# Patient Record
Sex: MALE | Race: WHITE | NOT HISPANIC OR LATINO | Employment: OTHER | URBAN - METROPOLITAN AREA
[De-identification: names, ages, dates, MRNs, and addresses within clinical notes are randomized per-mention and may not be internally consistent; named-entity substitution may affect disease eponyms.]

---

## 2021-10-21 ENCOUNTER — OFFICE VISIT (OUTPATIENT)
Dept: CARDIOLOGY | Facility: CLINIC | Age: 86
End: 2021-10-21

## 2021-10-21 VITALS
DIASTOLIC BLOOD PRESSURE: 86 MMHG | HEART RATE: 62 BPM | WEIGHT: 163 LBS | HEIGHT: 68 IN | SYSTOLIC BLOOD PRESSURE: 173 MMHG | BODY MASS INDEX: 24.71 KG/M2

## 2021-10-21 DIAGNOSIS — I10 PRIMARY HYPERTENSION: ICD-10-CM

## 2021-10-21 DIAGNOSIS — R94.31 ABNORMAL EKG: Primary | ICD-10-CM

## 2021-10-21 PROCEDURE — 93000 ELECTROCARDIOGRAM COMPLETE: CPT | Performed by: INTERNAL MEDICINE

## 2021-10-21 PROCEDURE — 99203 OFFICE O/P NEW LOW 30 MIN: CPT | Performed by: INTERNAL MEDICINE

## 2021-10-21 RX ORDER — MIRTAZAPINE 7.5 MG/1
TABLET, FILM COATED ORAL
COMMUNITY
Start: 2021-09-12

## 2021-10-21 RX ORDER — DIPHENOXYLATE HYDROCHLORIDE AND ATROPINE SULFATE 2.5; .025 MG/1; MG/1
TABLET ORAL
COMMUNITY

## 2021-10-21 RX ORDER — LOSARTAN POTASSIUM 100 MG/1
TABLET ORAL
COMMUNITY
Start: 2021-09-28

## 2021-10-21 RX ORDER — MAGNESIUM CARB/ALUMINUM HYDROX 105-160MG
TABLET,CHEWABLE ORAL ONCE
COMMUNITY

## 2021-10-21 RX ORDER — BUMETANIDE 1 MG/1
TABLET ORAL
COMMUNITY
Start: 2021-09-26

## 2021-10-21 RX ORDER — DOCUSATE SODIUM 100 MG/1
100 CAPSULE, LIQUID FILLED ORAL 2 TIMES DAILY
COMMUNITY

## 2021-10-21 RX ORDER — TIMOLOL MALEATE 5 MG/ML
SOLUTION/ DROPS OPHTHALMIC
COMMUNITY
Start: 2021-09-26

## 2021-10-21 RX ORDER — LANOLIN ALCOHOL/MO/W.PET/CERES
1000 CREAM (GRAM) TOPICAL DAILY
COMMUNITY

## 2021-10-21 NOTE — PROGRESS NOTES
ABNORMAL EKG, REFERRED BY DR. BIANCA LANE   Subjective:        [facility]  Cardiology Consult Note    Patient Identification:  Name: Nino Kat  Age: 89 y.o.  Sex: male  :  1930  MRN: [id number]             CC  ABNORMAL EKG      History of Present Illness:   89-year-old male here for the cardiac evaluation as the patient has been found to have the abnormal EKG, patient also known to have the benign essential arterial hypertension    No significant chest pains    Complains of breath on moderate exertion only    Comorbid cardiac risk factors:     Past Medical History:  History reviewed. No pertinent past medical history.  Past Surgical History:  Past Surgical History:   Procedure Laterality Date   • ROTATOR CUFF REPAIR Left    • TOTAL HIP ARTHROPLASTY Left       Allergies:  Allergies   Allergen Reactions   • Diclofenac Hives and Rash     Gel     Home Meds:  (Not in a hospital admission)    Current Meds:   [unfilled]  Social History:   Social History     Tobacco Use   • Smoking status: Former Smoker     Quit date:      Years since quittin.8   • Smokeless tobacco: Never Used   Substance Use Topics   • Alcohol use: Yes     Comment: OCCASIONAL      Family History:  Family History   Family history unknown: Yes        Review of Systems    Constitutional: No weakness,fatigue, fever, rigors, chills   Eyes: No vision changes, eye pain   ENT/oropharynx: No difficulty swallowing, sore throat, epistaxis, changes in hearing   Cardiovascular: No chest pain, chest tightness, palpitations, paroxysmal nocturnal dyspnea, orthopnea, diaphoresis, dizziness / syncopal episode   Respiratory: No shortness of breath, dyspnea on exertion, cough, wheezing hemoptysis   Gastrointestinal: No abdominal pain, nausea, vomiting, diarrhea, bloody stools   Genitourinary: No hematuria, dysuria   Neurological: No headache, tremors, numbness,  one-sided weakness    Musculoskeletal: No cramps, myalgias,  joint pain, joint  swelling   Integument: No rash, edema           Constitutional:  Heart Rate:  [62] 62  BP: (173)/(86) 173/86    Physical Exam   General:  Appears in no acute distress  Eyes: PERTL,  HEENT:  No JVD. Thyroid not visibly enlarged. No mucosal pallor or cyanosis  Respiratory: Respirations regular and unlabored at rest. BBS with good air entry in all fields. No crackles, rubs or wheezes auscultated  Cardiovascular: S1S2 Regular rate and rhythm. No murmur, rub or gallop auscultated. No carotid bruits. DP/PT pulses    . No pretibial pitting edema  Gastrointestinal: Abdomen soft, flat, non tender. Bowel sounds present. No hepatosplenomegaly. No ascites  Musculoskeletal: DAWN x4. No abnormal movements  Extremities: No digital clubbing or cyanosis  Skin: Color pink. Skin warm and dry to touch. No rashes  No xanthoma  Neuro: AAO x3 CN II-XII grossly intact            ECG 12 Lead    Date/Time: 10/21/2021 9:55 AM  Performed by: Thomas Ramírez MD  Authorized by: Thomas Ramírez MD   Comparison: not compared with previous ECG   Previous ECG: no previous ECG available  Rhythm: sinus rhythm  ST Flattening: all    Clinical impression: non-specific ECG                Cardiographics  ECG:     Telemetry:    Echocardiogram:     Imaging  Chest X-ray:     Lab Review               @[email or web address]@              Assessment:/ Recommendations / Plan:   Patient Active Problem List   Diagnosis   • Abnormal EKG   • Primary hypertension                    ICD-10-CM ICD-9-CM   1. Abnormal EKG  R94.31 794.31   2. Primary hypertension  I10 401.9     1. Abnormal EKG  Considering the patient's symptoms as well as clinical situation and  EKG findings, along with cardiac risk factors, ischemic workup is necessary to rule out ischemic cardiomyopathy, stress induced arrhythmias, and functional capacity for diagnosis as well as prognostic consideration    - Stress Test With Myocardial Perfusion One Day  - Adult Transthoracic Echo Complete W/ Cont  if Necessary Per Protocol    2. Primary hypertension  Considering patient's medical condition as well as the risk factors, patient will require echocardiogram for further evaluation for the LV function, four-chamber evaluation, including the pressures, valvular function and  pericardial disease and pericardial effusion       ECHO. WALKING HARRIETT    Labs/tests ordered for meron Ramírez MD  10/21/2021, 09:55 EDT      EMR Dragon/Transcription:   Dictated utilizing Dragon dictation

## 2021-10-26 ENCOUNTER — HOSPITAL ENCOUNTER (OUTPATIENT)
Dept: CARDIOLOGY | Facility: HOSPITAL | Age: 86
Discharge: HOME | End: 2021-10-26
Admitting: INTERNAL MEDICINE

## 2021-10-26 VITALS
SYSTOLIC BLOOD PRESSURE: 138 MMHG | HEART RATE: 58 BPM | HEIGHT: 68 IN | DIASTOLIC BLOOD PRESSURE: 76 MMHG | WEIGHT: 163 LBS | BODY MASS INDEX: 24.71 KG/M2

## 2021-10-26 PROCEDURE — 93306 TTE W/DOPPLER COMPLETE: CPT | Performed by: INTERNAL MEDICINE

## 2021-10-26 PROCEDURE — 93306 TTE W/DOPPLER COMPLETE: CPT

## 2021-10-27 PROBLEM — I10 PRIMARY HYPERTENSION: Status: ACTIVE | Noted: 2021-10-27

## 2021-10-27 PROBLEM — R94.31 ABNORMAL EKG: Status: ACTIVE | Noted: 2021-10-27

## 2021-10-27 LAB
AORTIC DIMENSIONLESS INDEX: 0.6 (DI)
ASCENDING AORTA: 3.2 CM
BH CV ECHO MEAS - ACS: 1.8 CM
BH CV ECHO MEAS - AO MAX PG (FULL): 5 MMHG
BH CV ECHO MEAS - AO MAX PG: 9 MMHG
BH CV ECHO MEAS - AO MEAN PG (FULL): 2.9 MMHG
BH CV ECHO MEAS - AO MEAN PG: 5 MMHG
BH CV ECHO MEAS - AO ROOT AREA (BSA CORRECTED): 1.9
BH CV ECHO MEAS - AO ROOT AREA: 10.2 CM^2
BH CV ECHO MEAS - AO ROOT DIAM: 3.6 CM
BH CV ECHO MEAS - AO V2 MAX: 150.3 CM/SEC
BH CV ECHO MEAS - AO V2 MEAN: 104.6 CM/SEC
BH CV ECHO MEAS - AO V2 VTI: 34.5 CM
BH CV ECHO MEAS - ASC AORTA: 3.2 CM
BH CV ECHO MEAS - AVA(I,A): 2.3 CM^2
BH CV ECHO MEAS - AVA(I,D): 2.3 CM^2
BH CV ECHO MEAS - AVA(V,A): 2.5 CM^2
BH CV ECHO MEAS - AVA(V,D): 2.5 CM^2
BH CV ECHO MEAS - BSA(HAYCOCK): 1.9 M^2
BH CV ECHO MEAS - BSA: 1.9 M^2
BH CV ECHO MEAS - BZI_BMI: 24.8 KILOGRAMS/M^2
BH CV ECHO MEAS - BZI_METRIC_HEIGHT: 172.7 CM
BH CV ECHO MEAS - BZI_METRIC_WEIGHT: 73.9 KG
BH CV ECHO MEAS - EDV(CUBED): 56 ML
BH CV ECHO MEAS - EDV(MOD-SP2): 92 ML
BH CV ECHO MEAS - EDV(MOD-SP4): 87 ML
BH CV ECHO MEAS - EDV(TEICH): 63 ML
BH CV ECHO MEAS - EF(CUBED): 68.2 %
BH CV ECHO MEAS - EF(MOD-BP): 65.3 %
BH CV ECHO MEAS - EF(MOD-SP2): 65.2 %
BH CV ECHO MEAS - EF(MOD-SP4): 65.5 %
BH CV ECHO MEAS - EF(TEICH): 60.5 %
BH CV ECHO MEAS - ESV(CUBED): 17.8 ML
BH CV ECHO MEAS - ESV(MOD-SP2): 32 ML
BH CV ECHO MEAS - ESV(MOD-SP4): 30 ML
BH CV ECHO MEAS - ESV(TEICH): 24.9 ML
BH CV ECHO MEAS - FS: 31.8 %
BH CV ECHO MEAS - IVS/LVPW: 0.9
BH CV ECHO MEAS - IVSD: 0.98 CM
BH CV ECHO MEAS - LAT PEAK E' VEL: 7.6 CM/SEC
BH CV ECHO MEAS - LV DIASTOLIC VOL/BSA (35-75): 46.4 ML/M^2
BH CV ECHO MEAS - LV MASS(C)D: 125.5 GRAMS
BH CV ECHO MEAS - LV MASS(C)DI: 67 GRAMS/M^2
BH CV ECHO MEAS - LV MAX PG: 4.1 MMHG
BH CV ECHO MEAS - LV MEAN PG: 2 MMHG
BH CV ECHO MEAS - LV SYSTOLIC VOL/BSA (12-30): 16 ML/M^2
BH CV ECHO MEAS - LV V1 MAX: 100.9 CM/SEC
BH CV ECHO MEAS - LV V1 MEAN: 66.2 CM/SEC
BH CV ECHO MEAS - LV V1 VTI: 21.5 CM
BH CV ECHO MEAS - LVIDD: 3.8 CM
BH CV ECHO MEAS - LVIDS: 2.6 CM
BH CV ECHO MEAS - LVLD AP2: 8.3 CM
BH CV ECHO MEAS - LVLD AP4: 8.3 CM
BH CV ECHO MEAS - LVLS AP2: 6.6 CM
BH CV ECHO MEAS - LVLS AP4: 6.8 CM
BH CV ECHO MEAS - LVOT AREA (M): 3.8 CM^2
BH CV ECHO MEAS - LVOT AREA: 3.7 CM^2
BH CV ECHO MEAS - LVOT DIAM: 2.2 CM
BH CV ECHO MEAS - LVPWD: 1.1 CM
BH CV ECHO MEAS - MED PEAK E' VEL: 4.3 CM/SEC
BH CV ECHO MEAS - MR MAX PG: 66.3 MMHG
BH CV ECHO MEAS - MR MAX VEL: 407.2 CM/SEC
BH CV ECHO MEAS - MV A DUR: 0.11 SEC
BH CV ECHO MEAS - MV A MAX VEL: 86.3 CM/SEC
BH CV ECHO MEAS - MV DEC SLOPE: 288.6 CM/SEC^2
BH CV ECHO MEAS - MV DEC TIME: 267 SEC
BH CV ECHO MEAS - MV E MAX VEL: 59.7 CM/SEC
BH CV ECHO MEAS - MV E/A: 0.69
BH CV ECHO MEAS - MV MAX PG: 4.2 MMHG
BH CV ECHO MEAS - MV MEAN PG: 1.3 MMHG
BH CV ECHO MEAS - MV P1/2T MAX VEL: 100.2 CM/SEC
BH CV ECHO MEAS - MV P1/2T: 101.7 MSEC
BH CV ECHO MEAS - MV V2 MAX: 101.9 CM/SEC
BH CV ECHO MEAS - MV V2 MEAN: 52 CM/SEC
BH CV ECHO MEAS - MV V2 VTI: 32.3 CM
BH CV ECHO MEAS - MVA P1/2T LCG: 2.2 CM^2
BH CV ECHO MEAS - MVA(P1/2T): 2.2 CM^2
BH CV ECHO MEAS - MVA(VTI): 2.5 CM^2
BH CV ECHO MEAS - PA ACC TIME: 0.01 SEC
BH CV ECHO MEAS - PA MAX PG (FULL): 1.6 MMHG
BH CV ECHO MEAS - PA MAX PG: 5 MMHG
BH CV ECHO MEAS - PA PR(ACCEL): 74.2 MMHG
BH CV ECHO MEAS - PA V2 MAX: 111.3 CM/SEC
BH CV ECHO MEAS - PI END-D VEL: 79.4 CM/SEC
BH CV ECHO MEAS - PULM A REVS DUR: 0.14 SEC
BH CV ECHO MEAS - PULM A REVS VEL: 30.5 CM/SEC
BH CV ECHO MEAS - PULM DIAS VEL: 30.9 CM/SEC
BH CV ECHO MEAS - PULM S/D: 2
BH CV ECHO MEAS - PULM SYS VEL: 61.4 CM/SEC
BH CV ECHO MEAS - PVA(V,A): 4 CM^2
BH CV ECHO MEAS - PVA(V,D): 4 CM^2
BH CV ECHO MEAS - QP/QS: 1.1
BH CV ECHO MEAS - RAP SYSTOLE: 3 MMHG
BH CV ECHO MEAS - RV MAX PG: 3.4 MMHG
BH CV ECHO MEAS - RV MEAN PG: 1.7 MMHG
BH CV ECHO MEAS - RV V1 MAX: 91.7 CM/SEC
BH CV ECHO MEAS - RV V1 MEAN: 60 CM/SEC
BH CV ECHO MEAS - RV V1 VTI: 18.2 CM
BH CV ECHO MEAS - RVOT AREA: 4.8 CM^2
BH CV ECHO MEAS - RVOT DIAM: 2.5 CM
BH CV ECHO MEAS - RVSP: 28 MMHG
BH CV ECHO MEAS - SI(AO): 187 ML/M^2
BH CV ECHO MEAS - SI(CUBED): 20.4 ML/M^2
BH CV ECHO MEAS - SI(LVOT): 42.6 ML/M^2
BH CV ECHO MEAS - SI(MOD-SP2): 32 ML/M^2
BH CV ECHO MEAS - SI(MOD-SP4): 30.4 ML/M^2
BH CV ECHO MEAS - SI(TEICH): 20.3 ML/M^2
BH CV ECHO MEAS - SV(AO): 350.4 ML
BH CV ECHO MEAS - SV(CUBED): 38.2 ML
BH CV ECHO MEAS - SV(LVOT): 79.8 ML
BH CV ECHO MEAS - SV(MOD-SP2): 60 ML
BH CV ECHO MEAS - SV(MOD-SP4): 57 ML
BH CV ECHO MEAS - SV(RVOT): 88.1 ML
BH CV ECHO MEAS - SV(TEICH): 38.1 ML
BH CV ECHO MEAS - TAPSE (>1.6): 2.5 CM
BH CV ECHO MEAS - TR MAX PG: 25 MMHG
BH CV ECHO MEAS - TR MAX VEL: 248.4 CM/SEC
BH CV ECHO MEASUREMENTS AVERAGE E/E' RATIO: 10.03
BH CV XLRA - RV BASE: 3 CM
BH CV XLRA - RV LENGTH: 6.4 CM
BH CV XLRA - RV MID: 2.5 CM
BH CV XLRA - TDI S': 10.2 CM/SEC
LEFT ATRIUM VOLUME INDEX: 24.4 ML/M2
MAXIMAL PREDICTED HEART RATE: 129 BPM
SINUS: 3.3 CM
STJ: 2.8 CM
STRESS TARGET HR: 110 BPM

## 2021-10-28 ENCOUNTER — HOSPITAL ENCOUNTER (OUTPATIENT)
Dept: CARDIOLOGY | Facility: HOSPITAL | Age: 86
Discharge: HOME | End: 2021-10-28

## 2021-10-28 VITALS
OXYGEN SATURATION: 98 % | BODY MASS INDEX: 24.71 KG/M2 | RESPIRATION RATE: 20 BRPM | DIASTOLIC BLOOD PRESSURE: 73 MMHG | HEART RATE: 56 BPM | WEIGHT: 163 LBS | SYSTOLIC BLOOD PRESSURE: 151 MMHG | HEIGHT: 68 IN

## 2021-10-28 LAB
BH CV IMMEDIATE POST TECH DATA BLOOD PRESSURE: NORMAL MMHG
BH CV IMMEDIATE POST TECH DATA HEART RATE: 84 BPM
BH CV IMMEDIATE POST TECH DATA OXYGEN SATS: 99 %
BH CV REST NUCLEAR ISOTOPE DOSE: 10.9 MCI
BH CV STRESS BP STAGE 1: NORMAL
BH CV STRESS COMMENTS STAGE 1: NORMAL
BH CV STRESS DOSE REGADENOSON STAGE 1: 0.4
BH CV STRESS DURATION MIN STAGE 1: 1
BH CV STRESS DURATION SEC STAGE 1: 0
BH CV STRESS HR STAGE 1: 88
BH CV STRESS NUCLEAR ISOTOPE DOSE: 32.9 MCI
BH CV STRESS O2 STAGE 1: 99
BH CV STRESS PROTOCOL 1: NORMAL
BH CV STRESS RECOVERY BP: NORMAL MMHG
BH CV STRESS RECOVERY HR: 69 BPM
BH CV STRESS RECOVERY O2: 98 %
BH CV STRESS STAGE 1: 1
BH CV THREE MINUTE POST TECH DATA BLOOD PRESSURE: NORMAL MMHG
BH CV THREE MINUTE POST TECH DATA HEART RATE: 82 BPM
BH CV THREE MINUTE POST TECH DATA OXYGEN SATURATION: 98 %
LV EF NUC BP: 63 %
MAXIMAL PREDICTED HEART RATE: 129 BPM
PERCENT MAX PREDICTED HR: 68.22 %
STRESS BASELINE BP: NORMAL MMHG
STRESS BASELINE HR: 56 BPM
STRESS O2 SAT REST: 98 %
STRESS PERCENT HR: 80 %
STRESS POST ESTIMATED WORKLOAD: 1 METS
STRESS POST EXERCISE DUR MIN: 1 MIN
STRESS POST EXERCISE DUR SEC: 0 SEC
STRESS POST O2 SAT PEAK: 99 %
STRESS POST PEAK BP: NORMAL MMHG
STRESS POST PEAK HR: 88 BPM
STRESS TARGET HR: 110 BPM

## 2021-10-28 PROCEDURE — 25010000002 REGADENOSON 0.4 MG/5ML SOLUTION: Performed by: INTERNAL MEDICINE

## 2021-10-28 PROCEDURE — 93018 CV STRESS TEST I&R ONLY: CPT | Performed by: INTERNAL MEDICINE

## 2021-10-28 PROCEDURE — 78452 HT MUSCLE IMAGE SPECT MULT: CPT

## 2021-10-28 PROCEDURE — 78452 HT MUSCLE IMAGE SPECT MULT: CPT | Performed by: INTERNAL MEDICINE

## 2021-10-28 PROCEDURE — 93017 CV STRESS TEST TRACING ONLY: CPT

## 2021-10-28 PROCEDURE — 0 TECHNETIUM SESTAMIBI: Performed by: INTERNAL MEDICINE

## 2021-10-28 PROCEDURE — A9500 TC99M SESTAMIBI: HCPCS | Performed by: INTERNAL MEDICINE

## 2021-10-28 RX ADMIN — TECHNETIUM TC 99M SESTAMIBI 1 DOSE: 1 INJECTION INTRAVENOUS at 07:26

## 2021-10-28 RX ADMIN — REGADENOSON 0.4 MG: 0.08 INJECTION, SOLUTION INTRAVENOUS at 09:21

## 2021-10-28 RX ADMIN — TECHNETIUM TC 99M SESTAMIBI 1 DOSE: 1 INJECTION INTRAVENOUS at 09:21

## 2021-11-04 ENCOUNTER — OFFICE VISIT (OUTPATIENT)
Dept: CARDIOLOGY | Facility: CLINIC | Age: 86
End: 2021-11-04

## 2021-11-04 VITALS
BODY MASS INDEX: 24.86 KG/M2 | HEART RATE: 66 BPM | OXYGEN SATURATION: 95 % | DIASTOLIC BLOOD PRESSURE: 70 MMHG | SYSTOLIC BLOOD PRESSURE: 132 MMHG | WEIGHT: 164 LBS | HEIGHT: 68 IN

## 2021-11-04 DIAGNOSIS — I10 PRIMARY HYPERTENSION: Primary | ICD-10-CM

## 2021-11-04 DIAGNOSIS — R94.31 ABNORMAL EKG: ICD-10-CM

## 2021-11-04 PROCEDURE — 99212 OFFICE O/P EST SF 10 MIN: CPT | Performed by: INTERNAL MEDICINE

## 2021-11-04 NOTE — PROGRESS NOTES
"Test Results    Subjective:        Nino Kat is a 89 y.o. male who here for follow up    CC  Follow-up after the stress test and echocardiogram  HPI  89-year-old male with a history of abnormal EKG as well as hypertension here for the follow-up after stress test and echocardiogram with no chest pain shortness of breath on exertion no different than before     Problems Addressed this Visit        Cardiac and Vasculature    Abnormal EKG    Primary hypertension - Primary      Diagnoses       Codes Comments    Primary hypertension    -  Primary ICD-10-CM: I10  ICD-9-CM: 401.9     Abnormal EKG     ICD-10-CM: R94.31  ICD-9-CM: 794.31         .    The following portions of the patient's history were reviewed and updated as appropriate: allergies, current medications, past family history, past medical history, past social history, past surgical history and problem list.    Past Medical History:   Diagnosis Date   • Hypertension    • Rotator cuff disorder     tear and repaired both arms     reports that he quit smoking about 59 years ago. He has never used smokeless tobacco. He reports current alcohol use. He reports that he does not use drugs.   Family History   Family history unknown: Yes       Review of Systems  Constitutional: No wt loss, fever, fatigue  Gastrointestinal: No nausea, abdominal pain  Behavioral/Psych: No insomnia or anxiety   Cardiovascular no chest pain  Objective:       Physical Exam  /70   Pulse 66   Ht 172.7 cm (68\")   Wt 74.4 kg (164 lb)   SpO2 95%   BMI 24.94 kg/m²   General appearance: No acute changes   Neck: Trachea midline; NECK, supple, no thyromegaly or lymphadenopathy   Lungs: Normal size and shape, normal breath sounds, equal distribution of air, no rales and rhonchi   CV: S1-S2 regular, no murmurs, no rub, no gallop   Abdomen: Soft, nontender; no masses , no abnormal abdominal sounds   Extremities: No deformity , normal color , no peripheral edema   Skin: Normal temperature, " turgor and texture; no rash, ulcers          Procedures  Interpretation Summary       · Findings consistent with a normal ECG stress test.  · Left ventricular ejection fraction is normal. (Calculated EF = 63%).  · Myocardial perfusion imaging indicates a normal myocardial perfusion study with no evidence of ischemia.  · Impressions are consistent with a low risk study.     Interpretation Summary    · Estimated right ventricular systolic pressure from tricuspid regurgitation is normal (<35 mmHg).  · There is calcification of the aortic valve.  · Calculated left ventricular EF = 65.3% Estimated left ventricular EF was in agreement with the calculated left ventricular EF.  · Left ventricular diastolic function was normal.  · There is no evidence of pericardial effusion        Echocardiogram:        Current Outpatient Medications:   •  Aspirin Buf,CaCarb-MgCarb-MgO, 81 MG tablet, Take 81 mg by mouth Daily., Disp: , Rfl:   •  bumetanide (BUMEX) 1 MG tablet, , Disp: , Rfl:   •  calcium citrate-vitamin d (CITRACAL) 200-250 MG-UNIT tablet tablet, Take  by mouth Daily., Disp: , Rfl:   •  docusate sodium (COLACE) 100 MG capsule, Take 100 mg by mouth 2 (Two) Times a Day., Disp: , Rfl:   •  losartan (COZAAR) 100 MG tablet, , Disp: , Rfl:   •  magnesium citrate 1.745 GM/30ML solution solution, Take  by mouth 1 (One) Time., Disp: , Rfl:   •  mirtazapine (REMERON) 7.5 MG tablet, , Disp: , Rfl:   •  multivitamin (MULTIPLE VITAMIN PO), Take  by mouth., Disp: , Rfl:   •  mupirocin (BACTROBAN) 2 % ointment, Apply 1 application topically to the appropriate area as directed 3 (Three) Times a Day., Disp: , Rfl:   •  timolol (TIMOPTIC) 0.5 % ophthalmic solution, , Disp: , Rfl:   •  vitamin B-12 (CYANOCOBALAMIN) 1000 MCG tablet, Take 1,000 mcg by mouth Daily., Disp: , Rfl:    Assessment:        Patient Active Problem List   Diagnosis   • Abnormal EKG   • Primary hypertension               Plan:            ICD-10-CM ICD-9-CM   1. Primary  hypertension  I10 401.9   2. Abnormal EKG  R94.31 794.31     1. Primary hypertension  Stable    2. Abnormal EKG  Work-up is negative       Specificity and sensitivity of the stress test/ cardiac workup has been explained. Pt has been explained if  Symptoms continue please go to ER, and further w/p will be required.    Also explained this does not rule out coronary artery disease or the future events, continue to emphasize on risk reductions for coronary artery disease    Pt also advised to contact PCP for other causes of symptoms    See in 1 yr  COUNSELING:    Nino Duff was given to patient for the following topics: diagnostic results, risk factor reductions, impressions, risks and benefits of treatment options and importance of treatment compliance .       SMOKING COUNSELING:    [unfilled]    Dictated using Dragon dictation

## 2022-11-10 ENCOUNTER — OFFICE VISIT (OUTPATIENT)
Dept: CARDIOLOGY | Facility: CLINIC | Age: 87
End: 2022-11-10

## 2022-11-10 VITALS
HEIGHT: 68 IN | WEIGHT: 173 LBS | HEART RATE: 61 BPM | DIASTOLIC BLOOD PRESSURE: 79 MMHG | SYSTOLIC BLOOD PRESSURE: 158 MMHG | BODY MASS INDEX: 26.22 KG/M2

## 2022-11-10 DIAGNOSIS — I10 PRIMARY HYPERTENSION: Primary | ICD-10-CM

## 2022-11-10 DIAGNOSIS — R94.31 ABNORMAL EKG: ICD-10-CM

## 2022-11-10 PROCEDURE — 93000 ELECTROCARDIOGRAM COMPLETE: CPT | Performed by: INTERNAL MEDICINE

## 2022-11-10 PROCEDURE — 99213 OFFICE O/P EST LOW 20 MIN: CPT | Performed by: INTERNAL MEDICINE

## 2022-11-10 NOTE — PROGRESS NOTES
"1 yr follow up    Subjective:        Nino Kat is a 89 y.o. male who here for follow up    CC  Follow-up abnormal EKG and hypertension  HPI  89-year-old male with abnormal EKG and benign essential arterial hypertension here for the follow-up with no complaints of chest pains tightness heaviness or the pressure sensation     Problems Addressed this Visit        Cardiac and Vasculature    Abnormal EKG    Primary hypertension - Primary   Diagnoses       Codes Comments    Primary hypertension    -  Primary ICD-10-CM: I10  ICD-9-CM: 401.9     Abnormal EKG     ICD-10-CM: R94.31  ICD-9-CM: 794.31         .    The following portions of the patient's history were reviewed and updated as appropriate: allergies, current medications, past family history, past medical history, past social history, past surgical history and problem list.    Past Medical History:   Diagnosis Date   • Hypertension    • Rotator cuff disorder     tear and repaired both arms     reports that he quit smoking about 60 years ago. He has never used smokeless tobacco. He reports current alcohol use. He reports that he does not use drugs.   Family History   Family history unknown: Yes       Review of Systems  Constitutional: No wt loss, fever, fatigue  Gastrointestinal: No nausea, abdominal pain  Behavioral/Psych: No insomnia or anxiety   Cardiovascular no chest pains or tightness in the chest  Objective:       Physical Exam  /79   Pulse 61   Ht 172.7 cm (68\")   Wt 78.5 kg (173 lb)   BMI 26.30 kg/m²   General appearance: No acute changes   Neck: Trachea midline; NECK, supple, no thyromegaly or lymphadenopathy   Lungs: Normal size and shape, normal breath sounds, equal distribution of air, no rales and rhonchi   CV: S1-S2 regular, no murmurs, no rub, no gallop   Abdomen: Soft, nontender; no masses , no abnormal abdominal sounds   Extremities: No deformity , normal color , no peripheral edema   Skin: Normal temperature, turgor and texture; no " rash, ulcers            ECG 12 Lead    Date/Time: 11/10/2022 2:03 PM  Performed by: Thomas Ramírez MD  Authorized by: Thomas Ramírez MD   Comparison: compared with previous ECG   Similar to previous ECG  Rhythm: sinus rhythm  ST Flattening: all    Clinical impression: non-specific ECG              Echocardiogram:        Current Outpatient Medications:   •  Aspirin Buf,CaCarb-MgCarb-MgO, 81 MG tablet, Take 81 mg by mouth Daily., Disp: , Rfl:   •  bumetanide (BUMEX) 1 MG tablet, , Disp: , Rfl:   •  calcium citrate-vitamin d (CITRACAL) 200-250 MG-UNIT tablet tablet, Take  by mouth Daily., Disp: , Rfl:   •  docusate sodium (COLACE) 100 MG capsule, Take 100 mg by mouth 2 (Two) Times a Day., Disp: , Rfl:   •  losartan (COZAAR) 100 MG tablet, , Disp: , Rfl:   •  magnesium citrate 1.745 GM/30ML solution solution, Take  by mouth 1 (One) Time., Disp: , Rfl:   •  mirtazapine (REMERON) 7.5 MG tablet, , Disp: , Rfl:   •  multivitamin (THERAGRAN) tablet tablet, Take  by mouth., Disp: , Rfl:   •  mupirocin (BACTROBAN) 2 % ointment, Apply 1 application topically to the appropriate area as directed 3 (Three) Times a Day., Disp: , Rfl:   •  timolol (TIMOPTIC) 0.5 % ophthalmic solution, , Disp: , Rfl:   •  vitamin B-12 (CYANOCOBALAMIN) 1000 MCG tablet, Take 1,000 mcg by mouth Daily., Disp: , Rfl:    Assessment:        Patient Active Problem List   Diagnosis   • Abnormal EKG   • Primary hypertension               Plan:            ICD-10-CM ICD-9-CM   1. Primary hypertension  I10 401.9   2. Abnormal EKG  R94.31 794.31     1. Primary hypertension  Blood pressure under control    2. Abnormal EKG  No chest pain       SEE IN 1 YR  COUNSELING:    Nino Duff was given to patient for the following topics: diagnostic results, risk factor reductions, impressions, risks and benefits of treatment options and importance of treatment compliance .       SMOKING COUNSELING:        Dictated using Dragon dictation

## 2023-01-01 ENCOUNTER — APPOINTMENT (OUTPATIENT)
Dept: CARDIOLOGY | Facility: HOSPITAL | Age: 88
DRG: 684 | End: 2023-01-01
Payer: MEDICARE

## 2023-01-01 ENCOUNTER — APPOINTMENT (OUTPATIENT)
Dept: CT IMAGING | Facility: HOSPITAL | Age: 88
DRG: 684 | End: 2023-01-01
Payer: MEDICARE

## 2023-01-01 ENCOUNTER — HOSPITAL ENCOUNTER (INPATIENT)
Facility: HOSPITAL | Age: 88
LOS: 4 days | Discharge: HOME | DRG: 684 | End: 2023-07-21
Attending: EMERGENCY MEDICINE | Admitting: HOSPITALIST
Payer: MEDICARE

## 2023-01-01 ENCOUNTER — TELEPHONE (OUTPATIENT)
Dept: CARDIOLOGY | Facility: CLINIC | Age: 88
End: 2023-01-01

## 2023-01-01 ENCOUNTER — APPOINTMENT (OUTPATIENT)
Dept: MRI IMAGING | Facility: HOSPITAL | Age: 88
DRG: 684 | End: 2023-01-01
Payer: MEDICARE

## 2023-01-01 ENCOUNTER — APPOINTMENT (OUTPATIENT)
Dept: ULTRASOUND IMAGING | Facility: HOSPITAL | Age: 88
DRG: 684 | End: 2023-01-01
Payer: MEDICARE

## 2023-01-01 ENCOUNTER — READMISSION MANAGEMENT (OUTPATIENT)
Dept: CALL CENTER | Facility: HOSPITAL | Age: 88
End: 2023-01-01
Payer: MEDICARE

## 2023-01-01 VITALS
SYSTOLIC BLOOD PRESSURE: 154 MMHG | RESPIRATION RATE: 18 BRPM | WEIGHT: 164 LBS | OXYGEN SATURATION: 94 % | HEIGHT: 67 IN | BODY MASS INDEX: 25.74 KG/M2 | HEART RATE: 115 BPM | TEMPERATURE: 98 F | DIASTOLIC BLOOD PRESSURE: 88 MMHG

## 2023-01-01 DIAGNOSIS — N17.9 ACUTE RENAL FAILURE, UNSPECIFIED ACUTE RENAL FAILURE TYPE: ICD-10-CM

## 2023-01-01 DIAGNOSIS — R53.1 RIGHT SIDED WEAKNESS: Primary | ICD-10-CM

## 2023-01-01 LAB
ALBUMIN SERPL-MCNC: 3.3 G/DL (ref 3.5–5.2)
ALBUMIN SERPL-MCNC: 3.3 G/DL (ref 3.5–5.2)
ALBUMIN SERPL-MCNC: 3.4 G/DL (ref 3.5–5.2)
ALBUMIN SERPL-MCNC: 4.3 G/DL (ref 3.5–5.2)
ALBUMIN/GLOB SERPL: 1.7 G/DL
ALP SERPL-CCNC: 86 U/L (ref 39–117)
ALT SERPL W P-5'-P-CCNC: 14 U/L (ref 1–41)
ANION GAP SERPL CALCULATED.3IONS-SCNC: 10 MMOL/L (ref 5–15)
ANION GAP SERPL CALCULATED.3IONS-SCNC: 8.6 MMOL/L (ref 5–15)
ANION GAP SERPL CALCULATED.3IONS-SCNC: 9 MMOL/L (ref 5–15)
AORTIC ARCH: 2.5 CM
AORTIC DIMENSIONLESS INDEX: 0.5 (DI)
ASCENDING AORTA: 3.5 CM
AST SERPL-CCNC: 19 U/L (ref 1–40)
BASOPHILS # BLD AUTO: 0.06 10*3/MM3 (ref 0–0.2)
BASOPHILS # BLD AUTO: 0.07 10*3/MM3 (ref 0–0.2)
BASOPHILS # BLD MANUAL: 0.09 10*3/MM3 (ref 0–0.2)
BASOPHILS NFR BLD AUTO: 0.8 % (ref 0–1.5)
BASOPHILS NFR BLD AUTO: 0.8 % (ref 0–1.5)
BASOPHILS NFR BLD AUTO: 0.9 % (ref 0–1.5)
BASOPHILS NFR BLD AUTO: 0.9 % (ref 0–1.5)
BASOPHILS NFR BLD MANUAL: 1 % (ref 0–1.5)
BH CV ECHO MEAS - ACS: 1.67 CM
BH CV ECHO MEAS - AO MAX PG: 20.1 MMHG
BH CV ECHO MEAS - AO MEAN PG: 9.4 MMHG
BH CV ECHO MEAS - AO ROOT DIAM: 3.4 CM
BH CV ECHO MEAS - AO V2 MAX: 224 CM/SEC
BH CV ECHO MEAS - AO V2 VTI: 44.7 CM
BH CV ECHO MEAS - AVA(I,D): 1.96 CM2
BH CV ECHO MEAS - EDV(CUBED): 89 ML
BH CV ECHO MEAS - EDV(MOD-SP2): 73 ML
BH CV ECHO MEAS - EDV(MOD-SP4): 58 ML
BH CV ECHO MEAS - EF(MOD-BP): 56.3 %
BH CV ECHO MEAS - EF(MOD-SP2): 60.3 %
BH CV ECHO MEAS - EF(MOD-SP4): 56.9 %
BH CV ECHO MEAS - ESV(CUBED): 28.2 ML
BH CV ECHO MEAS - ESV(MOD-SP2): 29 ML
BH CV ECHO MEAS - ESV(MOD-SP4): 25 ML
BH CV ECHO MEAS - FS: 31.8 %
BH CV ECHO MEAS - IVS/LVPW: 1.09 CM
BH CV ECHO MEAS - IVSD: 1.31 CM
BH CV ECHO MEAS - LAT PEAK E' VEL: 6.2 CM/SEC
BH CV ECHO MEAS - LV DIASTOLIC VOL/BSA (35-75): 30.7 CM2
BH CV ECHO MEAS - LV MASS(C)D: 209.8 GRAMS
BH CV ECHO MEAS - LV MAX PG: 4 MMHG
BH CV ECHO MEAS - LV MEAN PG: 2.32 MMHG
BH CV ECHO MEAS - LV SYSTOLIC VOL/BSA (12-30): 13.2 CM2
BH CV ECHO MEAS - LV V1 MAX: 100.3 CM/SEC
BH CV ECHO MEAS - LV V1 VTI: 23.7 CM
BH CV ECHO MEAS - LVIDD: 4.5 CM
BH CV ECHO MEAS - LVIDS: 3 CM
BH CV ECHO MEAS - LVOT AREA: 3.7 CM2
BH CV ECHO MEAS - LVOT DIAM: 2.17 CM
BH CV ECHO MEAS - LVPWD: 1.2 CM
BH CV ECHO MEAS - MED PEAK E' VEL: 4.4 CM/SEC
BH CV ECHO MEAS - MR MAX PG: 34.4 MMHG
BH CV ECHO MEAS - MR MAX VEL: 293.2 CM/SEC
BH CV ECHO MEAS - MV A DUR: 0.13 SEC
BH CV ECHO MEAS - MV A MAX VEL: 90.7 CM/SEC
BH CV ECHO MEAS - MV DEC SLOPE: 265.7 CM/SEC2
BH CV ECHO MEAS - MV DEC TIME: 288 MSEC
BH CV ECHO MEAS - MV E MAX VEL: 57.2 CM/SEC
BH CV ECHO MEAS - MV E/A: 0.63
BH CV ECHO MEAS - MV MAX PG: 4.7 MMHG
BH CV ECHO MEAS - MV MEAN PG: 1.34 MMHG
BH CV ECHO MEAS - MV P1/2T: 112.2 MSEC
BH CV ECHO MEAS - MV V2 VTI: 28.5 CM
BH CV ECHO MEAS - MVA(P1/2T): 1.96 CM2
BH CV ECHO MEAS - MVA(VTI): 3.1 CM2
BH CV ECHO MEAS - PA ACC TIME: 0.09 SEC
BH CV ECHO MEAS - PA V2 MAX: 108.6 CM/SEC
BH CV ECHO MEAS - PI END-D VEL: 116.2 CM/SEC
BH CV ECHO MEAS - PULM A REVS DUR: 0.14 SEC
BH CV ECHO MEAS - PULM A REVS VEL: 33.1 CM/SEC
BH CV ECHO MEAS - PULM DIAS VEL: 32.7 CM/SEC
BH CV ECHO MEAS - PULM S/D: 1.03
BH CV ECHO MEAS - PULM SYS VEL: 33.7 CM/SEC
BH CV ECHO MEAS - RVOT DIAM: 2.9 CM
BH CV ECHO MEAS - SI(MOD-SP2): 23.3 ML/M2
BH CV ECHO MEAS - SI(MOD-SP4): 17.5 ML/M2
BH CV ECHO MEAS - SV(LVOT): 87.3 ML
BH CV ECHO MEAS - SV(MOD-SP2): 44 ML
BH CV ECHO MEAS - SV(MOD-SP4): 33 ML
BH CV ECHO MEAS - TAPSE (>1.6): 2.6 CM
BH CV ECHO MEASUREMENTS AVERAGE E/E' RATIO: 10.79
BH CV ECHO SHUNT ASSESSMENT PERFORMED (HIDDEN SCRIPTING): 1
BH CV XLRA - TDI S': 10.8 CM/SEC
BILIRUB SERPL-MCNC: 0.4 MG/DL (ref 0–1.2)
BILIRUB UR QL STRIP: NEGATIVE
BUN SERPL-MCNC: 23 MG/DL (ref 8–23)
BUN SERPL-MCNC: 25 MG/DL (ref 8–23)
BUN SERPL-MCNC: 30 MG/DL (ref 8–23)
BUN SERPL-MCNC: 31 MG/DL (ref 8–23)
BUN SERPL-MCNC: 34 MG/DL (ref 8–23)
BUN/CREAT SERPL: 15.8 (ref 7–25)
BUN/CREAT SERPL: 16.9 (ref 7–25)
BUN/CREAT SERPL: 18 (ref 7–25)
BUN/CREAT SERPL: 19 (ref 7–25)
BUN/CREAT SERPL: 19.2 (ref 7–25)
CALCIUM SPEC-SCNC: 10 MG/DL (ref 8.2–9.6)
CALCIUM SPEC-SCNC: 10.8 MG/DL (ref 8.2–9.6)
CALCIUM SPEC-SCNC: 8.4 MG/DL (ref 8.2–9.6)
CALCIUM SPEC-SCNC: 8.7 MG/DL (ref 8.2–9.6)
CALCIUM SPEC-SCNC: 8.9 MG/DL (ref 8.2–9.6)
CHLORIDE SERPL-SCNC: 101 MMOL/L (ref 98–107)
CHLORIDE SERPL-SCNC: 107 MMOL/L (ref 98–107)
CHLORIDE SERPL-SCNC: 108 MMOL/L (ref 98–107)
CHLORIDE SERPL-SCNC: 111 MMOL/L (ref 98–107)
CHLORIDE SERPL-SCNC: 113 MMOL/L (ref 98–107)
CHOLEST SERPL-MCNC: 170 MG/DL (ref 0–200)
CLARITY UR: CLEAR
CO2 SERPL-SCNC: 20 MMOL/L (ref 22–29)
CO2 SERPL-SCNC: 21 MMOL/L (ref 22–29)
CO2 SERPL-SCNC: 21.4 MMOL/L (ref 22–29)
CO2 SERPL-SCNC: 24 MMOL/L (ref 22–29)
CO2 SERPL-SCNC: 28 MMOL/L (ref 22–29)
COLOR UR: YELLOW
CREAT SERPL-MCNC: 1.3 MG/DL (ref 0.76–1.27)
CREAT SERPL-MCNC: 1.46 MG/DL (ref 0.76–1.27)
CREAT SERPL-MCNC: 1.58 MG/DL (ref 0.76–1.27)
CREAT SERPL-MCNC: 1.83 MG/DL (ref 0.76–1.27)
CREAT SERPL-MCNC: 1.89 MG/DL (ref 0.76–1.27)
CREAT UR-MCNC: 45.6 MG/DL
DEPRECATED RDW RBC AUTO: 52.1 FL (ref 37–54)
DEPRECATED RDW RBC AUTO: 52.5 FL (ref 37–54)
DEPRECATED RDW RBC AUTO: 52.7 FL (ref 37–54)
DEPRECATED RDW RBC AUTO: 52.7 FL (ref 37–54)
DEPRECATED RDW RBC AUTO: 54.5 FL (ref 37–54)
EGFRCR SERPLBLD CKD-EPI 2021: 32.7 ML/MIN/1.73
EGFRCR SERPLBLD CKD-EPI 2021: 34 ML/MIN/1.73
EGFRCR SERPLBLD CKD-EPI 2021: 40.5 ML/MIN/1.73
EGFRCR SERPLBLD CKD-EPI 2021: 44.6 ML/MIN/1.73
EGFRCR SERPLBLD CKD-EPI 2021: 51.2 ML/MIN/1.73
EOSINOPHIL # BLD AUTO: 0.13 10*3/MM3 (ref 0–0.4)
EOSINOPHIL # BLD AUTO: 0.13 10*3/MM3 (ref 0–0.4)
EOSINOPHIL # BLD AUTO: 0.19 10*3/MM3 (ref 0–0.4)
EOSINOPHIL # BLD AUTO: 0.26 10*3/MM3 (ref 0–0.4)
EOSINOPHIL NFR BLD AUTO: 1.4 % (ref 0.3–6.2)
EOSINOPHIL NFR BLD AUTO: 1.6 % (ref 0.3–6.2)
EOSINOPHIL NFR BLD AUTO: 2.4 % (ref 0.3–6.2)
EOSINOPHIL NFR BLD AUTO: 3.4 % (ref 0.3–6.2)
EOSINOPHIL SPEC QL MICRO: 0 % EOS/100 CELLS (ref 0–0)
ERYTHROCYTE [DISTWIDTH] IN BLOOD BY AUTOMATED COUNT: 13.7 % (ref 12.3–15.4)
ERYTHROCYTE [DISTWIDTH] IN BLOOD BY AUTOMATED COUNT: 13.7 % (ref 12.3–15.4)
ERYTHROCYTE [DISTWIDTH] IN BLOOD BY AUTOMATED COUNT: 13.8 % (ref 12.3–15.4)
ERYTHROCYTE [DISTWIDTH] IN BLOOD BY AUTOMATED COUNT: 13.8 % (ref 12.3–15.4)
ERYTHROCYTE [DISTWIDTH] IN BLOOD BY AUTOMATED COUNT: 14.1 % (ref 12.3–15.4)
FERRITIN SERPL-MCNC: 446 NG/ML (ref 30–400)
FOLATE SERPL-MCNC: >20 NG/ML (ref 4.78–24.2)
GLOBULIN UR ELPH-MCNC: 2.5 GM/DL
GLUCOSE BLDC GLUCOMTR-MCNC: 130 MG/DL (ref 70–130)
GLUCOSE BLDC GLUCOMTR-MCNC: 90 MG/DL (ref 70–130)
GLUCOSE BLDC GLUCOMTR-MCNC: 99 MG/DL (ref 70–130)
GLUCOSE SERPL-MCNC: 89 MG/DL (ref 65–99)
GLUCOSE SERPL-MCNC: 92 MG/DL (ref 65–99)
GLUCOSE SERPL-MCNC: 93 MG/DL (ref 65–99)
GLUCOSE SERPL-MCNC: 95 MG/DL (ref 65–99)
GLUCOSE SERPL-MCNC: 98 MG/DL (ref 65–99)
GLUCOSE UR STRIP-MCNC: NEGATIVE MG/DL
HBA1C MFR BLD: 5.6 % (ref 4.8–5.6)
HCT VFR BLD AUTO: 28.9 % (ref 37.5–51)
HCT VFR BLD AUTO: 30.5 % (ref 37.5–51)
HCT VFR BLD AUTO: 31.6 % (ref 37.5–51)
HCT VFR BLD AUTO: 33.1 % (ref 37.5–51)
HCT VFR BLD AUTO: 33.3 % (ref 37.5–51)
HDLC SERPL-MCNC: 44 MG/DL (ref 40–60)
HGB BLD-MCNC: 10.5 G/DL (ref 13–17.7)
HGB BLD-MCNC: 11 G/DL (ref 13–17.7)
HGB BLD-MCNC: 11.2 G/DL (ref 13–17.7)
HGB BLD-MCNC: 11.2 G/DL (ref 13–17.7)
HGB BLD-MCNC: 9.9 G/DL (ref 13–17.7)
HGB UR QL STRIP.AUTO: NEGATIVE
HOLD SPECIMEN: NORMAL
IMM GRANULOCYTES # BLD AUTO: 0.02 10*3/MM3 (ref 0–0.05)
IMM GRANULOCYTES # BLD AUTO: 0.03 10*3/MM3 (ref 0–0.05)
IMM GRANULOCYTES # BLD AUTO: 0.04 10*3/MM3 (ref 0–0.05)
IMM GRANULOCYTES # BLD AUTO: 0.04 10*3/MM3 (ref 0–0.05)
IMM GRANULOCYTES NFR BLD AUTO: 0.3 % (ref 0–0.5)
IMM GRANULOCYTES NFR BLD AUTO: 0.4 % (ref 0–0.5)
IMM GRANULOCYTES NFR BLD AUTO: 0.4 % (ref 0–0.5)
IMM GRANULOCYTES NFR BLD AUTO: 0.5 % (ref 0–0.5)
INR PPP: 1.11 (ref 0.9–1.1)
IRON 24H UR-MRATE: 65 MCG/DL (ref 59–158)
IRON SATN MFR SERPL: 24 % (ref 20–50)
KETONES UR QL STRIP: NEGATIVE
LDLC SERPL CALC-MCNC: 113 MG/DL (ref 0–100)
LDLC/HDLC SERPL: 2.55 {RATIO}
LEFT ATRIUM VOLUME INDEX: 24.3 ML/M2
LEUKOCYTE ESTERASE UR QL STRIP.AUTO: NEGATIVE
LYMPHOCYTES # BLD AUTO: 1.14 10*3/MM3 (ref 0.7–3.1)
LYMPHOCYTES # BLD AUTO: 1.25 10*3/MM3 (ref 0.7–3.1)
LYMPHOCYTES # BLD AUTO: 1.3 10*3/MM3 (ref 0.7–3.1)
LYMPHOCYTES # BLD AUTO: 1.63 10*3/MM3 (ref 0.7–3.1)
LYMPHOCYTES # BLD MANUAL: 0.7 10*3/MM3 (ref 0.7–3.1)
LYMPHOCYTES NFR BLD AUTO: 14.4 % (ref 19.6–45.3)
LYMPHOCYTES NFR BLD AUTO: 14.4 % (ref 19.6–45.3)
LYMPHOCYTES NFR BLD AUTO: 16.4 % (ref 19.6–45.3)
LYMPHOCYTES NFR BLD AUTO: 20.3 % (ref 19.6–45.3)
LYMPHOCYTES NFR BLD MANUAL: 6 % (ref 5–12)
MAGNESIUM SERPL-MCNC: 1.8 MG/DL (ref 1.7–2.3)
MCH RBC QN AUTO: 35.2 PG (ref 26.6–33)
MCH RBC QN AUTO: 35.3 PG (ref 26.6–33)
MCH RBC QN AUTO: 35.5 PG (ref 26.6–33)
MCH RBC QN AUTO: 35.8 PG (ref 26.6–33)
MCH RBC QN AUTO: 36.7 PG (ref 26.6–33)
MCHC RBC AUTO-ENTMCNC: 33.6 G/DL (ref 31.5–35.7)
MCHC RBC AUTO-ENTMCNC: 33.8 G/DL (ref 31.5–35.7)
MCHC RBC AUTO-ENTMCNC: 34.3 G/DL (ref 31.5–35.7)
MCHC RBC AUTO-ENTMCNC: 34.4 G/DL (ref 31.5–35.7)
MCHC RBC AUTO-ENTMCNC: 34.8 G/DL (ref 31.5–35.7)
MCV RBC AUTO: 103.6 FL (ref 79–97)
MCV RBC AUTO: 104.1 FL (ref 79–97)
MCV RBC AUTO: 104.4 FL (ref 79–97)
MCV RBC AUTO: 104.7 FL (ref 79–97)
MCV RBC AUTO: 105.3 FL (ref 79–97)
MONOCYTES # BLD AUTO: 0.59 10*3/MM3 (ref 0.1–0.9)
MONOCYTES # BLD AUTO: 0.66 10*3/MM3 (ref 0.1–0.9)
MONOCYTES # BLD AUTO: 0.7 10*3/MM3 (ref 0.1–0.9)
MONOCYTES # BLD AUTO: 0.73 10*3/MM3 (ref 0.1–0.9)
MONOCYTES # BLD: 0.53 10*3/MM3 (ref 0.1–0.9)
MONOCYTES NFR BLD AUTO: 7.4 % (ref 5–12)
MONOCYTES NFR BLD AUTO: 8.1 % (ref 5–12)
MONOCYTES NFR BLD AUTO: 8.6 % (ref 5–12)
MONOCYTES NFR BLD AUTO: 8.7 % (ref 5–12)
NEUTROPHILS # BLD AUTO: 7.49 10*3/MM3 (ref 1.7–7)
NEUTROPHILS NFR BLD AUTO: 5.38 10*3/MM3 (ref 1.7–7)
NEUTROPHILS NFR BLD AUTO: 5.45 10*3/MM3 (ref 1.7–7)
NEUTROPHILS NFR BLD AUTO: 5.92 10*3/MM3 (ref 1.7–7)
NEUTROPHILS NFR BLD AUTO: 6.78 10*3/MM3 (ref 1.7–7)
NEUTROPHILS NFR BLD AUTO: 68.1 % (ref 42.7–76)
NEUTROPHILS NFR BLD AUTO: 70.4 % (ref 42.7–76)
NEUTROPHILS NFR BLD AUTO: 74.5 % (ref 42.7–76)
NEUTROPHILS NFR BLD AUTO: 74.9 % (ref 42.7–76)
NEUTROPHILS NFR BLD MANUAL: 85 % (ref 42.7–76)
NITRITE UR QL STRIP: NEGATIVE
NRBC BLD AUTO-RTO: 0 /100 WBC (ref 0–0.2)
OSMOLALITY UR: 323 MOSM/KG
OVALOCYTES BLD QL SMEAR: ABNORMAL
PH UR STRIP.AUTO: 6.5 [PH] (ref 5–8)
PHOSPHATE SERPL-MCNC: 2.1 MG/DL (ref 2.5–4.5)
PHOSPHATE SERPL-MCNC: 2.6 MG/DL (ref 2.5–4.5)
PHOSPHATE SERPL-MCNC: 2.7 MG/DL (ref 2.5–4.5)
PLAT MORPH BLD: NORMAL
PLATELET # BLD AUTO: 138 10*3/MM3 (ref 140–450)
PLATELET # BLD AUTO: 141 10*3/MM3 (ref 140–450)
PLATELET # BLD AUTO: 142 10*3/MM3 (ref 140–450)
PLATELET # BLD AUTO: 168 10*3/MM3 (ref 140–450)
PLATELET # BLD AUTO: 176 10*3/MM3 (ref 140–450)
PMV BLD AUTO: 11.8 FL (ref 6–12)
PMV BLD AUTO: 12 FL (ref 6–12)
PMV BLD AUTO: 12 FL (ref 6–12)
PMV BLD AUTO: 12.3 FL (ref 6–12)
PMV BLD AUTO: 12.3 FL (ref 6–12)
POIKILOCYTOSIS BLD QL SMEAR: ABNORMAL
POTASSIUM SERPL-SCNC: 4 MMOL/L (ref 3.5–5.2)
POTASSIUM SERPL-SCNC: 4 MMOL/L (ref 3.5–5.2)
POTASSIUM SERPL-SCNC: 4.2 MMOL/L (ref 3.5–5.2)
PROT SERPL-MCNC: 6.8 G/DL (ref 6–8.5)
PROT UR QL STRIP: NEGATIVE
PROTHROMBIN TIME: 14.4 SECONDS (ref 11.7–14.2)
PTH-INTACT SERPL-MCNC: 13.5 PG/ML (ref 15–65)
QT INTERVAL: 397 MS
RBC # BLD AUTO: 2.79 10*6/MM3 (ref 4.14–5.8)
RBC # BLD AUTO: 2.93 10*6/MM3 (ref 4.14–5.8)
RBC # BLD AUTO: 3 10*6/MM3 (ref 4.14–5.8)
RBC # BLD AUTO: 3.17 10*6/MM3 (ref 4.14–5.8)
RBC # BLD AUTO: 3.18 10*6/MM3 (ref 4.14–5.8)
SINUS: 3.6 CM
SODIUM SERPL-SCNC: 138 MMOL/L (ref 136–145)
SODIUM SERPL-SCNC: 138 MMOL/L (ref 136–145)
SODIUM SERPL-SCNC: 141 MMOL/L (ref 136–145)
SODIUM SERPL-SCNC: 142 MMOL/L (ref 136–145)
SODIUM SERPL-SCNC: 143 MMOL/L (ref 136–145)
SODIUM UR-SCNC: 61 MMOL/L
SP GR UR STRIP: 1.01 (ref 1–1.03)
STJ: 2.8 CM
TIBC SERPL-MCNC: 271 MCG/DL (ref 298–536)
TRANSFERRIN SERPL-MCNC: 182 MG/DL (ref 200–360)
TRIGL SERPL-MCNC: 69 MG/DL (ref 0–150)
TSH SERPL DL<=0.05 MIU/L-ACNC: 1.05 UIU/ML (ref 0.27–4.2)
URATE SERPL-MCNC: 7 MG/DL (ref 3.4–7)
UROBILINOGEN UR QL STRIP: NORMAL
UUN 24H UR-MCNC: 358 MG/DL
VARIANT LYMPHS NFR BLD MANUAL: 8 % (ref 19.6–45.3)
VIT B12 BLD-MCNC: 1405 PG/ML (ref 211–946)
VLDLC SERPL-MCNC: 13 MG/DL (ref 5–40)
WBC MORPH BLD: NORMAL
WBC NRBC COR # BLD: 7.64 10*3/MM3 (ref 3.4–10.8)
WBC NRBC COR # BLD: 7.94 10*3/MM3 (ref 3.4–10.8)
WBC NRBC COR # BLD: 8.01 10*3/MM3 (ref 3.4–10.8)
WBC NRBC COR # BLD: 8.81 10*3/MM3 (ref 3.4–10.8)
WBC NRBC COR # BLD: 9.05 10*3/MM3 (ref 3.4–10.8)
WHOLE BLOOD HOLD COAG: NORMAL
WHOLE BLOOD HOLD SPECIMEN: NORMAL

## 2023-01-01 PROCEDURE — 82607 VITAMIN B-12: CPT | Performed by: HOSPITALIST

## 2023-01-01 PROCEDURE — 83735 ASSAY OF MAGNESIUM: CPT | Performed by: HOSPITALIST

## 2023-01-01 PROCEDURE — 76775 US EXAM ABDO BACK WALL LIM: CPT

## 2023-01-01 PROCEDURE — 80061 LIPID PANEL: CPT | Performed by: HOSPITALIST

## 2023-01-01 PROCEDURE — 84466 ASSAY OF TRANSFERRIN: CPT | Performed by: HOSPITALIST

## 2023-01-01 PROCEDURE — 70551 MRI BRAIN STEM W/O DYE: CPT

## 2023-01-01 PROCEDURE — 93306 TTE W/DOPPLER COMPLETE: CPT

## 2023-01-01 PROCEDURE — 70544 MR ANGIOGRAPHY HEAD W/O DYE: CPT

## 2023-01-01 PROCEDURE — 97530 THERAPEUTIC ACTIVITIES: CPT

## 2023-01-01 PROCEDURE — 85025 COMPLETE CBC W/AUTO DIFF WBC: CPT | Performed by: HOSPITALIST

## 2023-01-01 PROCEDURE — 83970 ASSAY OF PARATHORMONE: CPT | Performed by: HOSPITALIST

## 2023-01-01 PROCEDURE — 82746 ASSAY OF FOLIC ACID SERUM: CPT | Performed by: HOSPITALIST

## 2023-01-01 PROCEDURE — 97166 OT EVAL MOD COMPLEX 45 MIN: CPT

## 2023-01-01 PROCEDURE — 70450 CT HEAD/BRAIN W/O DYE: CPT

## 2023-01-01 PROCEDURE — 80053 COMPREHEN METABOLIC PANEL: CPT | Performed by: EMERGENCY MEDICINE

## 2023-01-01 PROCEDURE — 85025 COMPLETE CBC W/AUTO DIFF WBC: CPT | Performed by: EMERGENCY MEDICINE

## 2023-01-01 PROCEDURE — 82570 ASSAY OF URINE CREATININE: CPT | Performed by: HOSPITALIST

## 2023-01-01 PROCEDURE — 80069 RENAL FUNCTION PANEL: CPT | Performed by: INTERNAL MEDICINE

## 2023-01-01 PROCEDURE — 80069 RENAL FUNCTION PANEL: CPT | Performed by: HOSPITALIST

## 2023-01-01 PROCEDURE — 80048 BASIC METABOLIC PNL TOTAL CA: CPT | Performed by: HOSPITALIST

## 2023-01-01 PROCEDURE — 99285 EMERGENCY DEPT VISIT HI MDM: CPT

## 2023-01-01 PROCEDURE — 83935 ASSAY OF URINE OSMOLALITY: CPT | Performed by: HOSPITALIST

## 2023-01-01 PROCEDURE — 83540 ASSAY OF IRON: CPT | Performed by: HOSPITALIST

## 2023-01-01 PROCEDURE — 83036 HEMOGLOBIN GLYCOSYLATED A1C: CPT | Performed by: HOSPITALIST

## 2023-01-01 PROCEDURE — 93306 TTE W/DOPPLER COMPLETE: CPT | Performed by: INTERNAL MEDICINE

## 2023-01-01 PROCEDURE — 85610 PROTHROMBIN TIME: CPT | Performed by: EMERGENCY MEDICINE

## 2023-01-01 PROCEDURE — 99222 1ST HOSP IP/OBS MODERATE 55: CPT | Performed by: PSYCHIATRY & NEUROLOGY

## 2023-01-01 PROCEDURE — 81003 URINALYSIS AUTO W/O SCOPE: CPT | Performed by: HOSPITALIST

## 2023-01-01 PROCEDURE — 97110 THERAPEUTIC EXERCISES: CPT

## 2023-01-01 PROCEDURE — 97116 GAIT TRAINING THERAPY: CPT

## 2023-01-01 PROCEDURE — 82948 REAGENT STRIP/BLOOD GLUCOSE: CPT

## 2023-01-01 PROCEDURE — 97162 PT EVAL MOD COMPLEX 30 MIN: CPT

## 2023-01-01 PROCEDURE — 84300 ASSAY OF URINE SODIUM: CPT | Performed by: HOSPITALIST

## 2023-01-01 PROCEDURE — 84540 ASSAY OF URINE/UREA-N: CPT | Performed by: HOSPITALIST

## 2023-01-01 PROCEDURE — 84550 ASSAY OF BLOOD/URIC ACID: CPT | Performed by: HOSPITALIST

## 2023-01-01 PROCEDURE — 97535 SELF CARE MNGMENT TRAINING: CPT

## 2023-01-01 PROCEDURE — 93005 ELECTROCARDIOGRAM TRACING: CPT | Performed by: EMERGENCY MEDICINE

## 2023-01-01 PROCEDURE — 70547 MR ANGIOGRAPHY NECK W/O DYE: CPT

## 2023-01-01 PROCEDURE — 84443 ASSAY THYROID STIM HORMONE: CPT | Performed by: HOSPITALIST

## 2023-01-01 PROCEDURE — 82728 ASSAY OF FERRITIN: CPT | Performed by: HOSPITALIST

## 2023-01-01 PROCEDURE — 93010 ELECTROCARDIOGRAM REPORT: CPT | Performed by: STUDENT IN AN ORGANIZED HEALTH CARE EDUCATION/TRAINING PROGRAM

## 2023-01-01 PROCEDURE — 85007 BL SMEAR W/DIFF WBC COUNT: CPT | Performed by: HOSPITALIST

## 2023-01-01 PROCEDURE — 87205 SMEAR GRAM STAIN: CPT | Performed by: HOSPITALIST

## 2023-01-01 RX ORDER — SODIUM CHLORIDE 0.9 % (FLUSH) 0.9 %
10 SYRINGE (ML) INJECTION EVERY 12 HOURS SCHEDULED
Status: DISCONTINUED | OUTPATIENT
Start: 2023-01-01 | End: 2023-01-01 | Stop reason: HOSPADM

## 2023-01-01 RX ORDER — VALACYCLOVIR HYDROCHLORIDE 500 MG/1
500 TABLET, FILM COATED ORAL 2 TIMES DAILY
COMMUNITY
End: 2023-01-01 | Stop reason: HOSPADM

## 2023-01-01 RX ORDER — TAMSULOSIN HYDROCHLORIDE 0.4 MG/1
0.4 CAPSULE ORAL DAILY
Status: DISCONTINUED | OUTPATIENT
Start: 2023-01-01 | End: 2023-01-01

## 2023-01-01 RX ORDER — SODIUM CHLORIDE 9 MG/ML
100 INJECTION, SOLUTION INTRAVENOUS CONTINUOUS
Status: DISCONTINUED | OUTPATIENT
Start: 2023-01-01 | End: 2023-01-01

## 2023-01-01 RX ORDER — SODIUM CHLORIDE 0.9 % (FLUSH) 0.9 %
10 SYRINGE (ML) INJECTION AS NEEDED
Status: DISCONTINUED | OUTPATIENT
Start: 2023-01-01 | End: 2023-01-01 | Stop reason: HOSPADM

## 2023-01-01 RX ORDER — ASPIRIN 300 MG/1
300 SUPPOSITORY RECTAL DAILY
Status: DISCONTINUED | OUTPATIENT
Start: 2023-01-01 | End: 2023-01-01 | Stop reason: HOSPADM

## 2023-01-01 RX ORDER — ACETAMINOPHEN 325 MG/1
650 TABLET ORAL EVERY 4 HOURS PRN
Status: DISCONTINUED | OUTPATIENT
Start: 2023-01-01 | End: 2023-01-01 | Stop reason: HOSPADM

## 2023-01-01 RX ORDER — POLYETHYLENE GLYCOL 3350 17 G/17G
17 POWDER, FOR SOLUTION ORAL DAILY
Status: DISCONTINUED | OUTPATIENT
Start: 2023-01-01 | End: 2023-01-01

## 2023-01-01 RX ORDER — ATORVASTATIN CALCIUM 80 MG/1
80 TABLET, FILM COATED ORAL NIGHTLY
Status: DISCONTINUED | OUTPATIENT
Start: 2023-01-01 | End: 2023-01-01 | Stop reason: HOSPADM

## 2023-01-01 RX ORDER — ASPIRIN 325 MG
325 TABLET ORAL DAILY
Status: DISCONTINUED | OUTPATIENT
Start: 2023-01-01 | End: 2023-01-01 | Stop reason: HOSPADM

## 2023-01-01 RX ORDER — ATORVASTATIN CALCIUM 80 MG/1
80 TABLET, FILM COATED ORAL NIGHTLY
Qty: 90 TABLET | Refills: 0 | Status: SHIPPED | OUTPATIENT
Start: 2023-01-01

## 2023-01-01 RX ORDER — POLYETHYLENE GLYCOL 3350 17 G/17G
17 POWDER, FOR SOLUTION ORAL DAILY
COMMUNITY

## 2023-01-01 RX ORDER — AMLODIPINE BESYLATE 5 MG/1
5 TABLET ORAL
Status: DISCONTINUED | OUTPATIENT
Start: 2023-01-01 | End: 2023-01-01 | Stop reason: HOSPADM

## 2023-01-01 RX ORDER — ASPIRIN 325 MG
325 TABLET ORAL DAILY
Qty: 30 TABLET | Refills: 0 | Status: SHIPPED | OUTPATIENT
Start: 2023-01-01

## 2023-01-01 RX ORDER — TAMSULOSIN HYDROCHLORIDE 0.4 MG/1
0.4 CAPSULE ORAL 2 TIMES DAILY
Status: DISCONTINUED | OUTPATIENT
Start: 2023-01-01 | End: 2023-01-01 | Stop reason: HOSPADM

## 2023-01-01 RX ORDER — AMOXICILLIN 250 MG
2 CAPSULE ORAL 2 TIMES DAILY
Status: DISCONTINUED | OUTPATIENT
Start: 2023-01-01 | End: 2023-01-01 | Stop reason: HOSPADM

## 2023-01-01 RX ORDER — SODIUM CHLORIDE 9 MG/ML
40 INJECTION, SOLUTION INTRAVENOUS AS NEEDED
Status: DISCONTINUED | OUTPATIENT
Start: 2023-01-01 | End: 2023-01-01 | Stop reason: HOSPADM

## 2023-01-01 RX ORDER — POLYETHYLENE GLYCOL 3350 17 G/17G
17 POWDER, FOR SOLUTION ORAL DAILY PRN
Status: DISCONTINUED | OUTPATIENT
Start: 2023-01-01 | End: 2023-01-01 | Stop reason: HOSPADM

## 2023-01-01 RX ORDER — ONDANSETRON 4 MG/1
4 TABLET, FILM COATED ORAL EVERY 6 HOURS PRN
Status: DISCONTINUED | OUTPATIENT
Start: 2023-01-01 | End: 2023-01-01 | Stop reason: HOSPADM

## 2023-01-01 RX ORDER — LACTULOSE 10 G/15ML
10 SOLUTION ORAL DAILY PRN
Qty: 150 ML | Refills: 0 | Status: SHIPPED | OUTPATIENT
Start: 2023-01-01

## 2023-01-01 RX ORDER — BISACODYL 10 MG
10 SUPPOSITORY, RECTAL RECTAL DAILY PRN
Status: DISCONTINUED | OUTPATIENT
Start: 2023-01-01 | End: 2023-01-01 | Stop reason: HOSPADM

## 2023-01-01 RX ORDER — LACTULOSE 10 G/15ML
10 SOLUTION ORAL DAILY
Status: DISCONTINUED | OUTPATIENT
Start: 2023-01-01 | End: 2023-01-01 | Stop reason: HOSPADM

## 2023-01-01 RX ORDER — AMLODIPINE BESYLATE 5 MG/1
5 TABLET ORAL
Qty: 30 TABLET | Refills: 0 | Status: SHIPPED | OUTPATIENT
Start: 2023-01-01

## 2023-01-01 RX ORDER — TIMOLOL MALEATE 5 MG/ML
1 SOLUTION/ DROPS OPHTHALMIC EVERY EVENING
Status: DISCONTINUED | OUTPATIENT
Start: 2023-01-01 | End: 2023-01-01 | Stop reason: HOSPADM

## 2023-01-01 RX ORDER — ACETAMINOPHEN 160 MG/5ML
650 SOLUTION ORAL EVERY 4 HOURS PRN
Status: DISCONTINUED | OUTPATIENT
Start: 2023-01-01 | End: 2023-01-01 | Stop reason: HOSPADM

## 2023-01-01 RX ORDER — BISACODYL 5 MG/1
5 TABLET, DELAYED RELEASE ORAL DAILY PRN
Status: DISCONTINUED | OUTPATIENT
Start: 2023-01-01 | End: 2023-01-01 | Stop reason: HOSPADM

## 2023-01-01 RX ORDER — TAMSULOSIN HYDROCHLORIDE 0.4 MG/1
0.4 CAPSULE ORAL 2 TIMES DAILY
Qty: 30 CAPSULE | Refills: 0 | Status: SHIPPED | OUTPATIENT
Start: 2023-01-01

## 2023-01-01 RX ORDER — ONDANSETRON 4 MG/1
4 TABLET, FILM COATED ORAL EVERY 6 HOURS PRN
COMMUNITY

## 2023-01-01 RX ORDER — ONDANSETRON 2 MG/ML
4 INJECTION INTRAMUSCULAR; INTRAVENOUS EVERY 6 HOURS PRN
Status: DISCONTINUED | OUTPATIENT
Start: 2023-01-01 | End: 2023-01-01 | Stop reason: HOSPADM

## 2023-01-01 RX ORDER — ACETAMINOPHEN 650 MG/1
650 SUPPOSITORY RECTAL EVERY 4 HOURS PRN
Status: DISCONTINUED | OUTPATIENT
Start: 2023-01-01 | End: 2023-01-01 | Stop reason: HOSPADM

## 2023-01-01 RX ORDER — MIRTAZAPINE 15 MG/1
7.5 TABLET, FILM COATED ORAL NIGHTLY
Status: DISCONTINUED | OUTPATIENT
Start: 2023-01-01 | End: 2023-01-01 | Stop reason: HOSPADM

## 2023-01-01 RX ADMIN — Medication 10 ML: at 09:28

## 2023-01-01 RX ADMIN — SENNOSIDES AND DOCUSATE SODIUM 2 TABLET: 50; 8.6 TABLET ORAL at 08:50

## 2023-01-01 RX ADMIN — SENNOSIDES AND DOCUSATE SODIUM 2 TABLET: 50; 8.6 TABLET ORAL at 09:27

## 2023-01-01 RX ADMIN — ATORVASTATIN CALCIUM 80 MG: 80 TABLET, FILM COATED ORAL at 20:22

## 2023-01-01 RX ADMIN — SENNOSIDES AND DOCUSATE SODIUM 2 TABLET: 50; 8.6 TABLET ORAL at 20:24

## 2023-01-01 RX ADMIN — TAMSULOSIN HYDROCHLORIDE 0.4 MG: 0.4 CAPSULE ORAL at 09:07

## 2023-01-01 RX ADMIN — TAMSULOSIN HYDROCHLORIDE 0.4 MG: 0.4 CAPSULE ORAL at 08:51

## 2023-01-01 RX ADMIN — ASPIRIN 325 MG: 325 TABLET ORAL at 08:50

## 2023-01-01 RX ADMIN — ACETAMINOPHEN 650 MG: 325 TABLET, FILM COATED ORAL at 09:38

## 2023-01-01 RX ADMIN — AMLODIPINE BESYLATE 5 MG: 5 TABLET ORAL at 11:11

## 2023-01-01 RX ADMIN — ATORVASTATIN CALCIUM 80 MG: 80 TABLET, FILM COATED ORAL at 21:04

## 2023-01-01 RX ADMIN — MIRTAZAPINE 7.5 MG: 15 TABLET, FILM COATED ORAL at 20:23

## 2023-01-01 RX ADMIN — LACTULOSE 10 G: 10 SOLUTION ORAL at 09:40

## 2023-01-01 RX ADMIN — POLYETHYLENE GLYCOL 3350 17 G: 17 POWDER, FOR SOLUTION ORAL at 09:27

## 2023-01-01 RX ADMIN — POLYETHYLENE GLYCOL 3350 17 G: 17 POWDER, FOR SOLUTION ORAL at 08:50

## 2023-01-01 RX ADMIN — ACETAMINOPHEN 650 MG: 325 TABLET, FILM COATED ORAL at 09:08

## 2023-01-01 RX ADMIN — Medication 10 ML: at 20:46

## 2023-01-01 RX ADMIN — SODIUM CHLORIDE 125 ML/HR: 9 INJECTION, SOLUTION INTRAVENOUS at 12:35

## 2023-01-01 RX ADMIN — ASPIRIN 325 MG: 325 TABLET ORAL at 09:27

## 2023-01-01 RX ADMIN — SENNOSIDES AND DOCUSATE SODIUM 2 TABLET: 50; 8.6 TABLET ORAL at 21:04

## 2023-01-01 RX ADMIN — ASPIRIN 325 MG: 325 TABLET ORAL at 09:08

## 2023-01-01 RX ADMIN — Medication 10 ML: at 08:51

## 2023-01-01 RX ADMIN — ATORVASTATIN CALCIUM 80 MG: 80 TABLET, FILM COATED ORAL at 20:46

## 2023-01-01 RX ADMIN — LACTULOSE 10 G: 10 SOLUTION ORAL at 09:08

## 2023-01-01 RX ADMIN — BISACODYL 5 MG: 5 TABLET ORAL at 20:47

## 2023-01-01 RX ADMIN — TIMOLOL MALEATE 1 DROP: 5 SOLUTION/ DROPS OPHTHALMIC at 16:31

## 2023-01-01 RX ADMIN — SENNOSIDES AND DOCUSATE SODIUM 2 TABLET: 50; 8.6 TABLET ORAL at 20:46

## 2023-01-01 RX ADMIN — TAMSULOSIN HYDROCHLORIDE 0.4 MG: 0.4 CAPSULE ORAL at 09:40

## 2023-01-01 RX ADMIN — MIRTAZAPINE 7.5 MG: 15 TABLET, FILM COATED ORAL at 20:46

## 2023-01-01 RX ADMIN — TIMOLOL MALEATE 1 DROP: 5 SOLUTION/ DROPS OPHTHALMIC at 18:27

## 2023-01-01 RX ADMIN — SODIUM CHLORIDE 100 ML/HR: 9 INJECTION, SOLUTION INTRAVENOUS at 00:30

## 2023-01-01 RX ADMIN — Medication 10 ML: at 21:12

## 2023-01-01 RX ADMIN — TAMSULOSIN HYDROCHLORIDE 0.4 MG: 0.4 CAPSULE ORAL at 20:45

## 2023-01-01 RX ADMIN — SODIUM CHLORIDE 125 ML/HR: 9 INJECTION, SOLUTION INTRAVENOUS at 15:45

## 2023-01-01 RX ADMIN — SENNOSIDES AND DOCUSATE SODIUM 2 TABLET: 50; 8.6 TABLET ORAL at 09:11

## 2023-01-01 RX ADMIN — AMLODIPINE BESYLATE 5 MG: 5 TABLET ORAL at 09:40

## 2023-01-01 RX ADMIN — SENNOSIDES AND DOCUSATE SODIUM 2 TABLET: 50; 8.6 TABLET ORAL at 09:40

## 2023-01-01 RX ADMIN — LACTULOSE 10 G: 10 SOLUTION ORAL at 11:11

## 2023-01-01 RX ADMIN — ASPIRIN 325 MG: 325 TABLET ORAL at 09:40

## 2023-01-01 RX ADMIN — Medication 10 ML: at 20:23

## 2023-01-01 RX ADMIN — Medication 10 ML: at 20:24

## 2023-01-01 RX ADMIN — POLYETHYLENE GLYCOL 3350 17 G: 17 POWDER, FOR SOLUTION ORAL at 09:08

## 2023-01-01 RX ADMIN — TAMSULOSIN HYDROCHLORIDE 0.4 MG: 0.4 CAPSULE ORAL at 20:22

## 2023-01-01 RX ADMIN — MIRTAZAPINE 7.5 MG: 15 TABLET, FILM COATED ORAL at 20:45

## 2023-01-01 RX ADMIN — AMLODIPINE BESYLATE 5 MG: 5 TABLET ORAL at 09:07

## 2023-01-01 RX ADMIN — Medication 10 ML: at 09:11

## 2023-01-01 RX ADMIN — TIMOLOL MALEATE 1 DROP: 5 SOLUTION/ DROPS OPHTHALMIC at 16:53

## 2023-01-01 RX ADMIN — SODIUM CHLORIDE 125 ML/HR: 9 INJECTION, SOLUTION INTRAVENOUS at 22:43

## 2023-01-01 RX ADMIN — MIRTAZAPINE 7.5 MG: 15 TABLET, FILM COATED ORAL at 21:03

## 2023-01-01 RX ADMIN — ASPIRIN 325 MG: 325 TABLET ORAL at 17:03

## 2023-01-01 RX ADMIN — Medication 10 ML: at 21:04

## 2023-01-01 RX ADMIN — TAMSULOSIN HYDROCHLORIDE 0.4 MG: 0.4 CAPSULE ORAL at 20:46

## 2023-01-01 RX ADMIN — TIMOLOL MALEATE 1 DROP: 5 SOLUTION/ DROPS OPHTHALMIC at 17:04

## 2023-07-17 PROBLEM — R53.1 RIGHT SIDED WEAKNESS: Status: ACTIVE | Noted: 2023-07-17

## 2023-07-17 PROBLEM — N17.9 ACUTE RENAL FAILURE: Status: ACTIVE | Noted: 2023-01-01

## 2023-07-17 NOTE — PLAN OF CARE
Problem: Adult Inpatient Plan of Care  Goal: Absence of Hospital-Acquired Illness or Injury  Intervention: Prevent and Manage VTE (Venous Thromboembolism) Risk  Recent Flowsheet Documentation  Taken 7/17/2023 1630 by Pilar Prado, RN  VTE Prevention/Management:   bilateral   sequential compression devices on  Goal: Readiness for Transition of Care  Intervention: Mutually Develop Transition Plan  Recent Flowsheet Documentation  Taken 7/17/2023 1501 by Pilar Prado, RN  Transportation Anticipated: family or friend will provide  Patient/Family Anticipated Services at Transition: none  Patient/Family Anticipates Transition to:   home   other (see comments)  Taken 7/17/2023 1459 by Pilar Prado, RN  Patient/Family Anticipated Services at Transition: none  Patient/Family Anticipates Transition to: (assisted living) other (see comments)  Taken 7/17/2023 1455 by Pilar Prado, RN  Equipment Currently Used at Home: walker, rolling   Goal Outcome Evaluation:

## 2023-07-17 NOTE — PROGRESS NOTES
Clinical Pharmacy Services: Medication History    Nino Kat is a 89 y.o. male presenting to [facility] for   Chief Complaint   Patient presents with    Extremity Weakness       He  has a past medical history of Hypertension and Rotator cuff disorder.    Allergies as of 07/17/2023 - Reviewed 07/17/2023   Allergen Reaction Noted    Diclofenac Hives and Rash 09/18/2020       Medication information was obtained from: Nursing Home Paperwork & Patient   Pharmacy and Phone Number:     Prior to Admission Medications       Prescriptions Last Dose Informant Patient Reported? Taking?    Aspirin Buf,CaCarb-MgCarb-MgO, 81 MG tablet 7/17/2023 Nursing Home, Self Yes Yes    Take 81 mg by mouth Daily.    bumetanide (BUMEX) 1 MG tablet 7/17/2023 Self, Nursing Home Yes Yes    Take 1 tablet by mouth Daily.    Calcium Carbonate-Vit D-Min (Calcium 600 + Minerals) 600-200 MG-UNIT tablet 7/17/2023 Nursing Home Yes Yes    Take 1 tablet by mouth 2 (Two) Times a Day With Meals.    docusate sodium (COLACE) 100 MG capsule  Nursing Home Yes Yes    Take 1 capsule by mouth 2 (Two) Times a Day As Needed.    losartan (COZAAR) 100 MG tablet  Nursing Home Yes Yes    Take 1 tablet by mouth Every Night.    mirtazapine (REMERON) 7.5 MG tablet  Nursing Home Yes Yes    Take 1 tablet by mouth Every Night.    multivitamin (THERAGRAN) tablet tablet  Self, Nursing Home Yes Yes    Take 1 tablet by mouth Daily.    Olopatadine HCl 0.7 % solution  Self, Other Yes Yes    Administer 1 drop to both eyes 2 (Two) Times a Day.    ondansetron (ZOFRAN) 4 MG tablet  Nursing Home Yes Yes    Take 1 tablet by mouth Every 6 (Six) Hours As Needed for Nausea or Vomiting.    polyethylene glycol (MIRALAX) 17 g packet  Nursing Home Yes Yes    Take 17 g by mouth Daily.    timolol (TIMOPTIC) 0.5 % ophthalmic solution  Nursing Home, Self Yes Yes    Administer 1 drop to both eyes Every Evening.    valACYclovir (VALTREX) 500 MG tablet  Other, Self Yes Yes    Take 1  tablet by mouth 2 (Two) Times a Day.    vitamin B-12 (CYANOCOBALAMIN) 1000 MCG tablet 7/17/2023 Nursing Home Yes Yes    Take 1 tablet by mouth Daily.    magnesium citrate 1.745 GM/30ML solution solution  Nursing Home Yes No    Take 300 mL by mouth 1 (One) Time.              Medication notes:     This medication list is complete to the best of my knowledge as of 7/17/2023    Please call if questions.    Esequiel Velásquez  Medication History Technician   [phone]    7/17/2023 14:05 EDT

## 2023-07-17 NOTE — ED NOTES
"Nursing report ED to floor  Nino Kat  89 y.o.  male    HPI :   Chief Complaint   Patient presents with    Extremity Weakness       Admitting doctor:   Edwin Cruz MD    Admitting diagnosis:   The primary encounter diagnosis was Right sided weakness. A diagnosis of Acute renal failure, unspecified acute renal failure type was also pertinent to this visit.    Code status:   Current Code Status       Date Active Code Status Order ID Comments User Context       Not on file            Allergies:   Diclofenac    Isolation:   No active isolations    Intake and Output  No intake or output data in the 24 hours ending 07/17/23 1339    Weight:       07/17/23  1025   Weight: 77.1 kg (170 lb)       Most recent vitals:   Vitals:    07/17/23 1025 07/17/23 1103 07/17/23 1201 07/17/23 1244   BP:  151/94 131/76    Patient Position:       Pulse:  61 60 70   Resp:       Temp:       TempSrc:       SpO2:  96% 95% 96%   Weight: 77.1 kg (170 lb)      Height: 170.2 cm (67\")          Active LDAs/IV Access:   Lines, Drains & Airways       Active LDAs       Name Placement date Placement time Site Days    Peripheral IV 07/17/23 1029 Right Antecubital 07/17/23  1029  Antecubital  less than 1                    Labs (abnormal labs have a star):   Labs Reviewed   COMPREHENSIVE METABOLIC PANEL - Abnormal; Notable for the following components:       Result Value    BUN 31 (*)     Creatinine 1.83 (*)     Calcium 10.8 (*)     eGFR 34.0 (*)     All other components within normal limits    Narrative:     GFR Normal >60  Chronic Kidney Disease <60  Kidney Failure <15    The GFR formula is only valid for adults with stable renal function between ages 18 and 70.   PROTIME-INR - Abnormal; Notable for the following components:    Protime 14.4 (*)     INR 1.11 (*)     All other components within normal limits   CBC WITH AUTO DIFFERENTIAL - Abnormal; Notable for the following components:    RBC 3.18 (*)     Hemoglobin 11.2 (*)     Hematocrit 33.3 (*)  "    .7 (*)     MCH 35.2 (*)     RDW-SD 54.5 (*)     All other components within normal limits   RAINBOW DRAW    Narrative:     The following orders were created for panel order [place] Draw.  Procedure                               Abnormality         Status                     ---------                               -----------         ------                     Green Top (Gel)[[id number]]                                  Final result               Lavender Top[[id number]]                                     Final result               Gold Top - SST[[id number]]                                   Final result               Gray Top[[id number]]                                         In process                 Light Blue Top[[id number]]                                   Final result                 Please view results for these tests on the individual orders.   GREEN TOP   LAVENDER TOP   GOLD TOP - SST   LIGHT BLUE TOP   CBC AND DIFFERENTIAL    Narrative:     The following orders were created for panel order CBC & Differential.  Procedure                               Abnormality         Status                     ---------                               -----------         ------                     CBC Auto Differential[[id number]]        Abnormal            Final result                 Please view results for these tests on the individual orders.   GRAY TOP       EKG:   ECG 12 Lead Stroke Evaluation   Preliminary Result   HEART RATE= 60  bpm   RR Interval= 1000  ms   OR Interval= 229  ms   P Horizontal Axis= 9  deg   P Front Axis= 38  deg   QRSD Interval= 76  ms   QT Interval= 397  ms   QRS Axis= -24  deg   T Wave Axis= -11  deg   - ABNORMAL ECG -   Sinus rhythm   Prolonged OR interval   Inferior infarct, age indeterminate   Anterior infarct, old   Baseline wander in lead(s) V6   Electronically Signed By:    Date and Time of Study: 2023-07-17 11:03:34          Meds given in ED:   Medications   sodium chloride 0.9 %  flush 10 mL (has no administration in time range)   sodium chloride 0.9 % infusion (125 mL/hr Intravenous New Bag [phone])       Imaging results:  No radiology results for the last day    Ambulatory status:   - ax1    Social issues:   Social History     Socioeconomic History    Marital status:    Tobacco Use    Smoking status: Former     Types: Cigarettes     Quit date:      Years since quittin.5    Smokeless tobacco: Never   Vaping Use    Vaping Use: Never used   Substance and Sexual Activity    Alcohol use: Yes     Comment: OCCASIONAL    Drug use: Never    Sexual activity: Defer       NIH Stroke Scale:       Brisa Ramirez RN  23 13:39 EDT

## 2023-07-17 NOTE — H&P
HISTORY AND PHYSICAL   [facility]        Patient Identification:  Name: Nino Kat  Age: 89 y.o.  Sex: male  :  1930  MRN: [id number]                     Primary Care Physician: Bakari Selby MD    Chief Complaint:  right sided weakness    History of Present Illness:        The patient  is a 89 y.o. male who presents to the hospital complaining of right-sided weakness.  Patient states that he has had increasing weakness of the right upper and lower extremity ongoing since February of this year.  Symptoms were worsened over the weekend.  Patient generally has walked without a walker but has had to use a walker over the last week or so.  He has difficulty using the walker because of his right arm weakness.  He says his right-sided weakness started when he had shingles in his right eye.  Denies headache, chest pain or trouble breathing.  He is compliant with his medications.  Denies recent illness.     Past Medical History:  Past Medical History:   Diagnosis Date    Hypertension     Rotator cuff disorder     tear and repaired both arms     Past Surgical History:  Past Surgical History:   Procedure Laterality Date    ROTATOR CUFF REPAIR Left     TOTAL HIP ARTHROPLASTY Left       Home Meds:  Medications Prior to Admission   Medication Sig Dispense Refill Last Dose    Aspirin Buf,CaCarb-MgCarb-MgO, 81 MG tablet Take 81 mg by mouth Daily.   2023    bumetanide (BUMEX) 1 MG tablet Take 1 tablet by mouth Daily.   2023    Calcium Carbonate-Vit D-Min (Calcium 600 + Minerals) 600-200 MG-UNIT tablet Take 1 tablet by mouth 2 (Two) Times a Day With Meals.   2023    docusate sodium (COLACE) 100 MG capsule Take 1 capsule by mouth 2 (Two) Times a Day As Needed.       losartan (COZAAR) 100 MG tablet Take 1 tablet by mouth Every Night.       mirtazapine (REMERON) 7.5 MG tablet Take 1 tablet by mouth Every Night.       multivitamin (THERAGRAN) tablet tablet Take 1 tablet by mouth Daily.        Olopatadine HCl 0.7 % solution Administer 1 drop to both eyes 2 (Two) Times a Day.       ondansetron (ZOFRAN) 4 MG tablet Take 1 tablet by mouth Every 6 (Six) Hours As Needed for Nausea or Vomiting.       polyethylene glycol (MIRALAX) 17 g packet Take 17 g by mouth Daily.       timolol (TIMOPTIC) 0.5 % ophthalmic solution Administer 1 drop to both eyes Every Evening.       valACYclovir (VALTREX) 500 MG tablet Take 1 tablet by mouth 2 (Two) Times a Day.       vitamin B-12 (CYANOCOBALAMIN) 1000 MCG tablet Take 1 tablet by mouth Daily.   7/17/2023    magnesium citrate 1.745 GM/30ML solution solution Take 300 mL by mouth 1 (One) Time.        Current meds    Current Facility-Administered Medications:     acetaminophen (TYLENOL) tablet 650 mg, 650 mg, Oral, Q4H PRN **OR** acetaminophen (TYLENOL) 160 MG/5ML solution 650 mg, 650 mg, Oral, Q4H PRN **OR** acetaminophen (TYLENOL) suppository 650 mg, 650 mg, Rectal, Q4H PRN, Edwin Cruz MD    aspirin tablet 325 mg, 325 mg, Oral, Daily, 325 mg at 07/17/23 1703 **OR** aspirin suppository 300 mg, 300 mg, Rectal, Daily, Edwin Cruz MD    atorvastatin (LIPITOR) tablet 80 mg, 80 mg, Oral, Nightly, Edwin Cruz MD    sennosides-docusate (PERICOLACE) 8.6-50 MG per tablet 2 tablet, 2 tablet, Oral, BID **AND** polyethylene glycol (MIRALAX) packet 17 g, 17 g, Oral, Daily PRN **AND** bisacodyl (DULCOLAX) EC tablet 5 mg, 5 mg, Oral, Daily PRN **AND** bisacodyl (DULCOLAX) suppository 10 mg, 10 mg, Rectal, Daily PRN, Edwin Cruz MD    mirtazapine (REMERON) tablet 7.5 mg, 7.5 mg, Oral, Nightly, Edwin Cruz MD    ondansetron (ZOFRAN) tablet 4 mg, 4 mg, Oral, Q6H PRN **OR** ondansetron (ZOFRAN) injection 4 mg, 4 mg, Intravenous, Q6H PRN, Edwin Cruz MD    polyethylene glycol (MIRALAX) packet 17 g, 17 g, Oral, Daily, Edwin Cruz MD    [COMPLETED] Insert Peripheral IV, , , Once **AND** sodium chloride 0.9 % flush 10 mL, 10 mL, Intravenous, PRN, CarineMario morrison MD     sodium chloride 0.9 % flush 10 mL, 10 mL, Intravenous, Q12H, Edwin Cruz MD    sodium chloride 0.9 % flush 10 mL, 10 mL, Intravenous, PRN, Edwin Cruz MD    sodium chloride 0.9 % flush 10 mL, 10 mL, Intravenous, Q12H, Edwin Cruz MD    sodium chloride 0.9 % flush 10 mL, 10 mL, Intravenous, PRN, Edwin Cruz MD    sodium chloride 0.9 % infusion 40 mL, 40 mL, Intravenous, PRN, Edwin Cruz, MD    sodium chloride 0.9 % infusion 40 mL, 40 mL, Intravenous, PRN, Edwin Cruz MD    sodium chloride 0.9 % infusion, 125 mL/hr, Intravenous, Continuous, Carine, Mario RUBIO MD, Last Rate: 125 mL/hr at 23 1235, 125 mL/hr at 23 1235    timolol (TIMOPTIC) 0.5 % ophthalmic solution 1 drop, 1 drop, Both Eyes, Q PM, Edwin Cruz MD, 1 drop at 23 1704  Allergies:  Allergies   Allergen Reactions    Diclofenac Hives and Rash     Gel     Immunizations:    There is no immunization history on file for this patient.  Social History:   Social History     Social History Narrative    Not on file     Social History     Socioeconomic History    Marital status:    Tobacco Use    Smoking status: Former     Types: Cigarettes     Quit date:      Years since quittin.5    Smokeless tobacco: Never   Vaping Use    Vaping Use: Never used   Substance and Sexual Activity    Alcohol use: Not Currently     Comment: OCCASIONAL    Drug use: Never    Sexual activity: Defer       Family History:  Family History   Family history unknown: Yes        Review of Systems  See history of present illness and past medical history.  Patient denies headache, dizziness, syncope, falls, trauma, change in vision, change in hearing, change in taste, changes in weight, changes in appetite, focal weakness, numbness, or paresthesia.  Patient denies chest pain, palpitations, dyspnea, orthopnea, PND, cough, sinus pressure, rhinorrhea, epistaxis, hemoptysis, nausea, vomiting, hematemesis, diarrhea, constipation or hematochezia. Denies  "cold or heat intolerance, polydipsia, polyuria, polyphagia. Denies hematuria, pyuria, dysuria, hesitancy, frequency or urgency.   Denies fever, chills, sweats, night sweats.  Denies missing any routine medications. Remainder of ROS is negative.    Objective:  tMax 24 hrs: Temp (24hrs), Av.7 °F (36.5 °C), Min:97.4 °F (36.3 °C), Max:97.8 °F (36.6 °C)    Vitals Ranges:   Temp:  [97.4 °F (36.3 °C)-97.8 °F (36.6 °C)] 97.4 °F (36.3 °C)  Heart Rate:  [57-72] 72  Resp:  [16-20] 18  BP: (131-175)/(76-94) 175/91      Exam:  /91 (BP Location: Left arm, Patient Position: Lying)   Pulse 72   Temp 97.4 °F (36.3 °C) (Oral)   Resp 18   Ht 170.2 cm (67\")   Wt 74.7 kg (164 lb 10.9 oz)   SpO2 98%   BMI 25.79 kg/m²     General Appearance:    Alert, cooperative, no distress, appears stated age   Head:    Normocephalic, without obvious abnormality, atraumatic   Eyes:    PERRL, conjunctivae/corneas clear, EOM's intact, both eyes   Ears:    Normal external ear canals, both ears   Nose:   Nares normal, septum midline, mucosa normal, no drainage    or sinus tenderness   Throat:   Lips, mucosa, and tongue normal   Neck:   Supple, symmetrical, trachea midline, no adenopathy;     thyroid:  no enlargement/tenderness/nodules; no carotid    bruit or JVD   Back:     Symmetric, no curvature, ROM normal, no CVA tenderness   Lungs:     Clear to auscultation bilaterally, respirations unlabored   Chest Wall:    No tenderness or deformity    Heart:    Regular rate and rhythm, S1 and S2 normal, no murmur, rub   or gallop   Abdomen:     Soft, nontender, bowel sounds active all four quadrants,     no masses, no hepatomegaly, no splenomegaly   Extremities:   Extremities normal, atraumatic, no cyanosis or edema   Pulses:   2+ and symmetric all extremities   Skin:   Skin color, texture, turgor normal, no rashes or lesions   Lymph nodes:   Cervical, supraclavicular, and axillary nodes normal   Neurologic: He has some right-sided weakness    "   .    Data Review:  Lab Results (last 72 hours)       Procedure Component Value Units Date/Time    [place] Draw [[id number]] Collected: 07/17/23 1028    Specimen: Blood Updated: 07/17/23 1430    Narrative:      The following orders were created for panel order [place] Draw.  Procedure                               Abnormality         Status                     ---------                               -----------         ------                     Green Top (Gel)[[id number]]                                  Final result               Lavender Top[[id number]]                                     Final result               Gold Top - SST[[id number]]                                   Final result               Gray Top[[id number]]                                         Final result               Light Blue Top[[id number]]                                   Final result                 Please view results for these tests on the individual orders.    Gray Top [[id number]] Collected: 07/17/23 1028    Specimen: Blood Updated: 07/17/23 1430     Extra Tube Hold for add-ons.     Comment: Auto resulted.       Green Top (Gel) [[id number]] Collected: 07/17/23 1028    Specimen: Blood Updated: 07/17/23 1130     Extra Tube Hold for add-ons.     Comment: Auto resulted.       Gold Top - SST [[id number]] Collected: 07/17/23 1028    Specimen: Blood Updated: 07/17/23 1130     Extra Tube Hold for add-ons.     Comment: Auto resulted.       Lavender Top [[id number]] Collected: 07/17/23 1028    Specimen: Blood Updated: 07/17/23 1130     Extra Tube hold for add-on     Comment: Auto resulted       Light Blue Top [[id number]] Collected: 07/17/23 1028    Specimen: Blood Updated: 07/17/23 1130     Extra Tube Hold for add-ons.     Comment: Auto resulted       Comprehensive Metabolic Panel [[id number]]  (Abnormal) Collected: 07/17/23 1028    Specimen: Blood Updated: 07/17/23 1111     Glucose 98 mg/dL      BUN 31 mg/dL      Creatinine 1.83 mg/dL      Sodium 138  mmol/L      Potassium 4.2 mmol/L      Chloride 101 mmol/L      CO2 28.0 mmol/L      Calcium 10.8 mg/dL      Total Protein 6.8 g/dL      Albumin 4.3 g/dL      ALT (SGPT) 14 U/L      AST (SGOT) 19 U/L      Alkaline Phosphatase 86 U/L      Total Bilirubin 0.4 mg/dL      Globulin 2.5 gm/dL      A/G Ratio 1.7 g/dL      BUN/Creatinine Ratio 16.9     Anion Gap 9.0 mmol/L      eGFR 34.0 mL/min/1.73     Narrative:      GFR Normal >60  Chronic Kidney Disease <60  Kidney Failure <15    The GFR formula is only valid for adults with stable renal function between ages 18 and 70.    CBC & Differential [[id number]]  (Abnormal) Collected: 07/17/23 1028    Specimen: Blood Updated: 07/17/23 1108    Narrative:      The following orders were created for panel order CBC & Differential.  Procedure                               Abnormality         Status                     ---------                               -----------         ------                     CBC Auto Differential[[id number]]        Abnormal            Final result                 Please view results for these tests on the individual orders.    CBC Auto Differential [[id number]]  (Abnormal) Collected: 07/17/23 1028    Specimen: Blood Updated: 07/17/23 1108     WBC 8.01 10*3/mm3      RBC 3.18 10*6/mm3      Hemoglobin 11.2 g/dL      Hematocrit 33.3 %      .7 fL      MCH 35.2 pg      MCHC 33.6 g/dL      RDW 14.1 %      RDW-SD 54.5 fl      MPV 12.0 fL      Platelets 176 10*3/mm3      Neutrophil % 68.1 %      Lymphocyte % 20.3 %      Monocyte % 8.7 %      Eosinophil % 1.6 %      Basophil % 0.9 %      Immature Grans % 0.4 %      Neutrophils, Absolute 5.45 10*3/mm3      Lymphocytes, Absolute 1.63 10*3/mm3      Monocytes, Absolute 0.70 10*3/mm3      Eosinophils, Absolute 0.13 10*3/mm3      Basophils, Absolute 0.07 10*3/mm3      Immature Grans, Absolute 0.03 10*3/mm3      nRBC 0.0 /100 WBC     Protime-INR [[id number]]  (Abnormal) Collected: 07/17/23 1028    Specimen: Blood  Updated: 07/17/23 1106     Protime 14.4 Seconds      INR 1.11                     Imaging Results (All)       Procedure Component Value Units Date/Time    CT Head Without Contrast [[id number]] Collected: 07/17/23 1203     Updated: 07/17/23 1203    Narrative:      CT HEAD WITHOUT CONTRAST     HISTORY: Weakness, fall.     COMPARISON: None.     FINDINGS: The brain and ventricles are symmetrical. There is  age-appropriate atrophy. Moderate-to-severe vascular calcification is  noted. Areas of decreased attenuation involving the white matter of the  cerebral hemispheres are noted bilaterally consistent with  mild-to-moderate small vessel ischemic disease. There is no evidence of  a hemorrhage, hydrocephalus or of a focal area of decreased attenuation  to suggest acute infarction. Further evaluation could be performed with  an MRI examination of the brain as indicated.           Radiation dose reduction techniques were utilized, including automated  exposure control and exposure modulation based on body size.                Past Medical History:   Diagnosis Date    Hypertension     Rotator cuff disorder     tear and repaired both arms       Assessment:  Active Hospital Problems    Diagnosis  POA    **Right sided weakness [R53.1]  Yes    Acute renal failure [N17.9]  Unknown    Primary hypertension [I10]  Yes      Resolved Hospital Problems   No resolved problems to display.       Plan:  The patient is admitted to the hospital to get MRI and MRA of brain and neck.  We will ask for neurology consult.  We will get PT OT and speech therapy evaluation.  We will give aspirin and statin.    Edwin Cruz MD  7/17/2023  19:41 EDT

## 2023-07-17 NOTE — ED TRIAGE NOTES
Pt arrives via EMS from [facility]. Pt complains of right leg weakness for the last month. States that it was the worst yesterday but that it feels better today. No neuro deficits noted at this time.

## 2023-07-18 PROBLEM — D64.9 ANEMIA: Status: ACTIVE | Noted: 2023-01-01

## 2023-07-18 PROBLEM — N18.30 STAGE 3 CHRONIC KIDNEY DISEASE: Status: ACTIVE | Noted: 2023-01-01

## 2023-07-18 PROBLEM — E78.5 HLD (HYPERLIPIDEMIA): Status: ACTIVE | Noted: 2023-01-01

## 2023-07-18 NOTE — PLAN OF CARE
Goal Outcome Evaluation:  Plan of Care Reviewed With: patient           Outcome Evaluation: Pt is a 89 y/o M admitted with R sided weakness, imaging negative for acute infarct. Of note, pt recently had shingles on R side and in eye. Pt reports vision has improved but still impaired since this. He has recently started using a RW d/t weakness. Resides at an [facility] and reports being indep with ADLs. He presents today with RLE weakness, otherwise close to baseline fxn. SBA for bed mobility and CGA for STS and TFs. He performs fxl ambulation in room and hallway with CGA, easily distracted and requires some cues for redirection. Pt left supine in bed with all needs in reach, RN getting recliner for pt to be OOB. Recommending d/c back to facility with HHOT services.

## 2023-07-18 NOTE — THERAPY EVALUATION
Patient Name: Nino Kat  : 1930    MRN: [id number]                              Today's Date: 2023       Admit Date: 2023    Visit Dx:     ICD-10-CM ICD-9-CM   1. Right sided weakness  R53.1 728.87   2. Acute renal failure, unspecified acute renal failure type  N17.9 584.9     Patient Active Problem List   Diagnosis    Abnormal EKG    Primary hypertension    Right sided weakness    Acute renal failure    Stage 3 chronic kidney disease    Anemia    HLD (hyperlipidemia)     Past Medical History:   Diagnosis Date    Hypertension     Rotator cuff disorder     tear and repaired both arms     Past Surgical History:   Procedure Laterality Date    ROTATOR CUFF REPAIR Left     TOTAL HIP ARTHROPLASTY Left       General Information       Row Name 23 1503          OT Time and Intention    Document Type evaluation  -     Mode of Treatment co-treatment;occupational therapy;physical therapy  -       Row Name 23 1503          General Information    Patient Profile Reviewed yes  -JW     Prior Level of Function independent:;ADL's;all household mobility  indep with ADls and fxl mobility, recently started using a rollator  -     Existing Precautions/Restrictions fall  -     Barriers to Rehab none identified  -       Row Name 23 1503          Living Environment    People in Home facility resident  [facility]  -       Row Name 23 1503          Home Main Entrance    Number of Stairs, Main Entrance none  -       Row Name 23 1503          Cognition    Orientation Status (Cognition) oriented x 3  -       Row Name 23 1503          Safety Issues, Functional Mobility    Safety Issues Affecting Function (Mobility) insight into deficits/self-awareness  -     Impairments Affecting Function (Mobility) balance;endurance/activity tolerance;strength  -               User Key  (r) = Recorded By, (t) = Taken By, (c) = Cosigned By      Initials Name Provider Type    Myra Lizama  OT Occupational Therapist                     Mobility/ADL's       [facility] Name 07/18/23 1504          Bed Mobility    Supine-Sit [place] (Bed Mobility) standby assist  -     Sit-Supine [place] (Bed Mobility) standby assist  -     Assistive Device (Bed Mobility) head of bed elevated  -JW       Row Name 07/18/23 1504          Transfers    Transfers sit-stand transfer  -JW       Row Name 07/18/23 1504          Sit-Stand Transfer    Sit-Stand [place] (Transfers) contact guard;verbal cues  -     Assistive Device (Sit-Stand Transfers) walker, front-wheeled  -[facility] Name 07/18/23 1504          Functional Mobility    Functional Mobility- Ind. Level contact guard assist  -     Functional Mobility- Device walker, front-wheeled  -     Functional Mobility- Comment performs fxl ambulation around room and in hallway using RW with CGA. Difficulty remaining attention to task as he can be easily distracted  -               User Key  (r) = Recorded By, (t) = Taken By, (c) = Cosigned By      Initials Name Provider Type     Myra Abarca OT Occupational Therapist                   Obj/Interventions       [facility] Name 07/18/23 1507          Sensory Assessment (Somatosensory)    Sensory Assessment (Somatosensory) UE sensation intact  -JW       Row Name 07/18/23 1507          Vision Assessment/Intervention    Vision Assessment Comment reports vision in R eye improved but still impaired from having shingles in eye recently  -[facility] Name 07/18/23 1507          Range of Motion Comprehensive    General Range of Motion bilateral upper extremity ROM WNL  -JW       Row Name 07/18/23 1507          Strength Comprehensive (MMT)    Comment, General Manual Muscle Testing (MMT) Assessment UB strength WFL and symmetrical. RLE weakness noted  -JW       Row Name 07/18/23 1507          Balance    Balance Assessment sitting static balance;sitting dynamic balance;standing static balance;standing dynamic balance  -      Static Sitting Balance standby assist  -JW     Dynamic Sitting Balance standby assist  -JW     Position, Sitting Balance sitting edge of bed  -JW     Static Standing Balance contact guard  -JW     Dynamic Standing Balance contact guard  -JW     Position/Device Used, Standing Balance supported;walker, front-wheeled  -JW               User Key  (r) = Recorded By, (t) = Taken By, (c) = Cosigned By      Initials Name Provider Type    JW Myra Abarca, OT Occupational Therapist                   Goals/Plan       Row Name 07/18/23 1520          Transfer Goal 1 (OT)    Activity/Assistive Device (Transfer Goal 1, OT) transfers, all  -JW     Wright Level/Cues Needed (Transfer Goal 1, OT) modified independence  -JW     Time Frame (Transfer Goal 1, OT) short term goal (STG);2 weeks  -JW     Progress/Outcome (Transfer Goal 1, OT) goal ongoing  -[facility] Name 07/18/23 1520          Dressing Goal 1 (OT)    Activity/Device (Dressing Goal 1, OT) dressing skills, all  -JW     Wright/Cues Needed (Dressing Goal 1, OT) modified independence  -JW     Time Frame (Dressing Goal 1, OT) short term goal (STG);2 weeks  -JW     Progress/Outcome (Dressing Goal 1, OT) goal ongoing  -[facility] Name 07/18/23 1520          Toileting Goal 1 (OT)    Activity/Device (Toileting Goal 1, OT) toileting skills, all  -     Wright Level/Cues Needed (Toileting Goal 1, OT) modified independence  -JW     Time Frame (Toileting Goal 1, OT) short term goal (STG);2 weeks  -JW     Progress/Outcome (Toileting Goal 1, OT) goal ongoing  -[facility] Name 07/18/23 1520          Grooming Goal 1 (OT)    Activity/Device (Grooming Goal 1, OT) grooming skills, all  -JW     Wright (Grooming Goal 1, OT) independent  -JW     Time Frame (Grooming Goal 1, OT) short term goal (STG);2 weeks  -     Strategies/Barriers (Grooming Goal 1, OT) sitting at the sink  -JW     Progress/Outcome (Grooming Goal 1, OT) goal ongoing  -[facility] Name 07/18/23  1520          Therapy Assessment/Plan (OT)    Planned Therapy Interventions (OT) activity tolerance training;BADL retraining;functional balance retraining;occupation/activity based interventions;patient/caregiver education/training;transfer/mobility retraining;strengthening exercise  -               User Key  (r) = Recorded By, (t) = Taken By, (c) = Cosigned By      Initials Name Provider Type    Myra Lizama OT Occupational Therapist                   Clinical Impression       Row Name 07/18/23 1510          Pain Assessment    Pretreatment Pain Rating 0/10 - no pain  -     Posttreatment Pain Rating 0/10 - no pain  -       Row Name 07/18/23 1510          Plan of Care Review    Plan of Care Reviewed With patient  -     Outcome Evaluation Pt is a 89 y/o M admitted with R sided weakness, imaging negative for acute infarct. Of note, pt recently had shingles on R side and in eye. Pt reports vision has improved but still impaired since this. He has recently started using a RW d/t weakness. Resides at an [facility] and reports being indep with ADLs. He presents today with RLE weakness, otherwise close to baseline fxn. SBA for bed mobility and CGA for STS and TFs. He performs fxl ambulation in room and hallway with CGA, easily distracted and requires some cues for redirection. Pt left supine in bed with all needs in reach, RN getting recliner for pt to be OOB. Recommending d/c back to facility with HHOT services.  -       Row Name 07/18/23 1510          Therapy Assessment/Plan (OT)    Rehab Potential (OT) good, to achieve stated therapy goals  -     Criteria for Skilled Therapeutic Interventions Met (OT) yes;skilled treatment is necessary  -     Therapy Frequency (OT) 5 times/wk  -       Row Name 07/18/23 1510          Therapy Plan Review/Discharge Plan (OT)    Anticipated Discharge Disposition (OT) home with assist;home with home health  -       Row Name 07/18/23 1510          Positioning and Restraints     Pre-Treatment Position in bed  -JW     Post Treatment Position bed  -JW     In Bed notified nsg;fowlers;call light within reach;encouraged to call for assist;exit alarm on  -JW               User Key  (r) = Recorded By, (t) = Taken By, (c) = Cosigned By      Initials Name Provider Type    Myra Lizama OT Occupational Therapist                   Outcome Measures       Row Name 07/18/23 1523          How much help from another is currently needed...    Putting on and taking off regular lower body clothing? 3  -JW     Bathing (including washing, rinsing, and drying) 3  -JW     Toileting (which includes using toilet bed pan or urinal) 3  -JW     Putting on and taking off regular upper body clothing 3  -JW     Taking care of personal grooming (such as brushing teeth) 3  -JW     Eating meals 4  -     AM-PAC 6 Clicks Score (OT) 19  -       Row Name 07/18/23 1438          How much help from another person do you currently need...    Turning from your back to your side while in flat bed without using bedrails? 4  -CS     Moving from lying on back to sitting on the side of a flat bed without bedrails? 3  -CS     Moving to and from a bed to a chair (including a wheelchair)? 3  -CS     Standing up from a chair using your arms (e.g., wheelchair, bedside chair)? 3  -CS     Climbing 3-5 steps with a railing? 2  -CS     To walk in hospital room? 3  -CS     AM-PAC 6 Clicks Score (PT) 18  -CS     Highest level of mobility 6 --> Walked 10 steps or more  -       Row Name 07/18/23 1523          Modified Hank Scale    Modified Nance Scale 4 - Moderately severe disability.  Unable to walk without assistance, and unable to attend to own bodily needs without assistance.  -       Row Name 07/18/23 1523 07/18/23 1438       Functional Assessment    Outcome Measure Options AM-PAC 6 Clicks Daily Activity (OT);Modified Nance  -JW AM-PAC 6 Clicks Basic Mobility (PT)  -CS              User Key  (r) = Recorded By, (t) = Taken By, (c)  = Cosigned By      Initials Name Provider Type     Myra Abarca OT Occupational Therapist    Milagro Mijares PT Physical Therapist                    Occupational Therapy Education       Title: PT OT SLP Therapies (In Progress)       Topic: Occupational Therapy (In Progress)       Point: ADL training (Done)       Description:   Instruct learner(s) on proper safety adaptation and remediation techniques during self care or transfers.   Instruct in proper use of assistive devices.                  Learning Progress Summary             Patient Acceptance, E, VU by  at 7/18/2023 1525    Comment: role of OT, plan of care, safety                         Point: Home exercise program (Not Started)       Description:   Instruct learner(s) on appropriate technique for monitoring, assisting and/or progressing therapeutic exercises/activities.                  Learner Progress:  Not documented in this visit.              Point: Precautions (Done)       Description:   Instruct learner(s) on prescribed precautions during self-care and functional transfers.                  Learning Progress Summary             Patient Acceptance, E, VU by  at 7/18/2023 1525    Comment: role of OT, plan of care, safety                         Point: Body mechanics (Done)       Description:   Instruct learner(s) on proper positioning and spine alignment during self-care, functional mobility activities and/or exercises.                  Learning Progress Summary             Patient Acceptance, E, VU by  at 7/18/2023 1525    Comment: role of OT, plan of care, safety                                         User Key       Initials Effective Dates Name Provider Type [facility] 06/10/21 -  Myra Abarca OT Occupational Therapist OT                  OT Recommendation and Plan  Planned Therapy Interventions (OT): activity tolerance training, BADL retraining, functional balance retraining, occupation/activity based interventions,  patient/caregiver education/training, transfer/mobility retraining, strengthening exercise  Therapy Frequency (OT): 5 times/wk  Plan of Care Review  Plan of Care Reviewed With: patient  Outcome Evaluation: Pt is a 89 y/o M admitted with R sided weakness, imaging negative for acute infarct. Of note, pt recently had shingles on R side and in eye. Pt reports vision has improved but still impaired since this. He has recently started using a RW d/t weakness. Resides at an [facility] and reports being indep with ADLs. He presents today with RLE weakness, otherwise close to baseline fxn. SBA for bed mobility and CGA for STS and TFs. He performs fxl ambulation in room and hallway with CGA, easily distracted and requires some cues for redirection. Pt left supine in bed with all needs in reach, RN getting recliner for pt to be OOB. Recommending d/c back to facility with HHOT services.     Time Calculation:   Evaluation Complexity (OT)  Review Occupational Profile/Medical/Therapy History Complexity: expanded/moderate complexity  Assessment, Occupational Performance/Identification of Deficit Complexity: 3-5 performance deficits  Clinical Decision Making Complexity (OT): detailed assessment/moderate complexity  Overall Complexity of Evaluation (OT): moderate complexity     Time Calculation- OT       Row Name 07/18/23 1525 07/18/23 1438          Time Calculation- OT    OT Start Time 1305  -JW --     OT Stop Time 1324  -JW --     OT Time Calculation (min) 19 min  -JW --     Total Timed Code Minutes- OT 9 minute(s)  -JW --     OT Received On 07/18/23 -JW --     OT - Next Appointment 07/19/23 -JW --     OT Goal Re-Cert Due Date 08/01/23 -JW --        Timed Charges    75809 - Gait Training Minutes  -- 8  -CS     63632 - OT Therapeutic Activity Minutes 9 -JW --        Untimed Charges    OT Eval/Re-eval Minutes 10  -JW --        Total Minutes    Timed Charges Total Minutes 9 -JW 8  -CS     Untimed Charges Total Minutes 10  -JW --       Total Minutes 19  -JW 8  -CS               User Key  (r) = Recorded By, (t) = Taken By, (c) = Cosigned By      Initials Name Provider Type    Myra Lizama OT Occupational Therapist    Milagro Mijares PT Physical Therapist                  Therapy Charges for Today       Code Description Service Date Service Provider Modifiers Qty    [id number]  OT THERAPEUTIC ACT EA 15 MIN 7/18/2023 Myra Abarca OT GO 1    [id number]  OT EVAL MOD COMPLEXITY 3 7/18/2023 Myra Abarca OT GO 1                 Myra Abarca OT  7/18/2023

## 2023-07-18 NOTE — PROGRESS NOTES
Discharge Planning Assessment  [facility]     Patient Name: Nino Kat  MRN: [id number]  Today's Date: 7/18/2023    Admit Date: 7/17/2023    Plan: Return to [facility] personal care with on site therapies   Discharge Needs Assessment       Row Name 07/18/23 1432       Living Environment    People in Home facility resident    Current Living Arrangements assisted living facility    Provides Primary Care For no one    Family Caregiver if Needed child(lexis), adult    Family Caregiver Names meenu Martin    Quality of Family Relationships helpful;involved;supportive    Able to Return to Prior Arrangements yes       Resource/Environmental Concerns    Resource/Environmental Concerns none       Transition Planning    Patient/Family Anticipates Transition to home with help/services    Patient/Family Anticipated Services at Transition home health care;rehabilitation services    Transportation Anticipated family or friend will provide       Discharge Needs Assessment    Current Outpatient/Agency/Support Group assisted living facility    Equipment Currently Used at Home walker, rolling    Concerns to be Addressed discharge planning    Outpatient/Agency/Support Group Needs assisted living facility    Discharge Facility/Level of Care Needs assisted living facility    Discharge Coordination/Progress [facility]                   Discharge Plan       Row Name 07/18/23 3153       Plan    Plan Return to [facility] personal care with on site therapies    Patient/Family in Agreement with Plan yes    Plan Comments Pt admitted from [facility] where he resides in personal care and, per VALARIE (Rasheeda, [phone]), can return if remains at assist of 1 for ambulation and transfers. Pt is independent for mobility with walker if needed at baseline, and facility can offer on site therapies if needed. Pt uses [facility]. Facility requests transfer packet at d/c. CCP to follow for PT/OT evals to confirm d/c plan. Pina  CARLEY Fox                  Continued Care and Services - Admitted Since 7/17/2023       Destination       Service Provider Request Status Selected Services Address Phone Fax Patient Preferred    [facility] [place] Pending - Request Sent N/A [id number] [facility] [address] [phone] --                  Expected Discharge Date and Time       Expected Discharge Date Expected Discharge Time    Jul 20, 2023            Demographic Summary       Row Name 07/18/23 1430       General Information    Admission Type inpatient    Arrived From home    Required Notices Provided Important Message from Medicare    Referral Source admission list    Reason for Consult discharge planning    Preferred Language English                   Functional Status       Row Name 07/18/23 1430       Functional Status    Usual Activity Tolerance good    Current Activity Tolerance good       Functional Status, IADL    Medications independent    Meal Preparation independent    Housekeeping independent    Laundry independent    Shopping independent       Mental Status Summary    Recent Changes in Mental Status/Cognitive Functioning no changes                   Psychosocial    No documentation.                  Abuse/Neglect    No documentation.                  Legal    No documentation.                  Substance Abuse    No documentation.                  Patient Forms    No documentation.                     Milena Fox LCSW

## 2023-07-18 NOTE — CONSULTS
[facility] Consult Note      Patient Name: Nino Kat  : 1930  MRN: [id number]  Primary Care Physician:  Bakari Selby MD  Referring Physician: Edwin Cruz MD  Date of admission: 2023    Subjective     Reason for Consult: Elevated serum creatinine    HPI:   Nino Kat is a 89 y.o. male who denies any prior knowledge of abnormal kidney function, admitted yesterday for further evaluation of right arm and leg weakness for the past several months that acutely worsened 2 days prior to arrival.  SCR found to be 1.8, with value 1.9 today.  He had had an SCR of 1.7 in March of this year.  Full PMH outlined below; pertinent is hypertension on losartan and bumetanide.  Bladder scan earlier today found large urinary retention, with I/O catheterization yielding 1500 mL urine.    Describes abdominal distention and discomfort for the preceding few days that has improved following I/L catheterization  Usually has no dysuria or straining; no gross hematuria  Appetite fair; no N/V  Has recently developed constipation  No fever or chills  No chest pain with exertion; no shortness of breath at rest; stable leg swelling    Review of Systems:   14 point review of systems is otherwise negative except for mentioned above on HPI    Personal History     Past Medical History:   Diagnosis Date    Hypertension     Rotator cuff disorder     tear and repaired both arms       Past Surgical History:   Procedure Laterality Date    ROTATOR CUFF REPAIR Left     TOTAL HIP ARTHROPLASTY Left        Family History: Family history is unknown by patient.    Social History:  reports that he quit smoking about 61 years ago. His smoking use included cigarettes. He has never used smokeless tobacco. He reports that he does not currently use alcohol. He reports that he does not use drugs.    Home Medications:  Prior to Admission medications    Medication Sig Start Date End Date Taking? Authorizing  Provider   Aspirin KikoCaCarb-MgCarb-MgO, 81 MG tablet Take 81 mg by mouth Daily.   Yes Didi Mendez MD   bumetanide (BUMEX) 1 MG tablet Take 1 tablet by mouth Daily.   Yes Didi Mendez MD   Calcium Carbonate-Vit D-Min (Calcium 600 + Minerals) 600-200 MG-UNIT tablet Take 1 tablet by mouth 2 (Two) Times a Day With Meals.   Yes Didi Mendez MD   docusate sodium (COLACE) 100 MG capsule Take 1 capsule by mouth 2 (Two) Times a Day As Needed.   Yes Didi Mendez MD   losartan (COZAAR) 100 MG tablet Take 1 tablet by mouth Every Night.   Yes Didi Mendez MD   mirtazapine (REMERON) 7.5 MG tablet Take 1 tablet by mouth Every Night.   Yes Didi Mendez MD   multivitamin (THERAGRAN) tablet tablet Take 1 tablet by mouth Daily.   Yes Didi Mendez MD   Olopatadine HCl 0.7 % solution Administer 1 drop to both eyes 2 (Two) Times a Day.   Yes Didi Mendez MD   ondansetron (ZOFRAN) 4 MG tablet Take 1 tablet by mouth Every 6 (Six) Hours As Needed for Nausea or Vomiting.   Yes iDdi Mendez MD   polyethylene glycol (MIRALAX) 17 g packet Take 17 g by mouth Daily.   Yes Didi Mendez MD   timolol (TIMOPTIC) 0.5 % ophthalmic solution Administer 1 drop to both eyes Every Evening.   Yes Didi Mendez MD   valACYclovir (VALTREX) 500 MG tablet Take 1 tablet by mouth 2 (Two) Times a Day.   Yes Didi Mendez MD   vitamin B-12 (CYANOCOBALAMIN) 1000 MCG tablet Take 1 tablet by mouth Daily.   Yes Didi Mendez MD   magnesium citrate 1.745 GM/30ML solution solution Take 300 mL by mouth 1 (One) Time.    Didi Mendez MD       Allergies:  Allergies   Allergen Reactions    Diclofenac Hives and Rash     Gel       Objective     Vitals:   Temp:  [97.4 °F (36.3 °C)-98.2 °F (36.8 °C)] 97.8 °F (36.6 °C)  Heart Rate:  [57-72] 61  Resp:  [18-20] 20  BP: (139-175)/() 171/94    Intake/Output Summary (Last 24 hours) at 7/18/2023 1404  Last  data filed at 7/18/2023 1300  Gross per 24 hour   Intake 1580 ml   Output 2050 ml   Net -470 ml       Physical Exam:   Constitutional: Awake, alert, NAD, looks younger than age  HEENT: Sclera anicteric, no conjunctival injection  Neck: Supple, no carotid bruit, trachea at midline, no JVD  Respiratory: Clear to auscultation bilaterally, nonlabored respiration  Cardiovascular: RRR, no rub  Gastrointestinal: BS +, soft, nontender, distended, fullness appreciated  : ?palpable bladder  Musculoskeletal: +1 doughy edema, no clubbing or cyanosis  Psychiatric: Appropriate affect, cooperative, oriented  Neurologic: moving all extremities, normal speech and mental status  Skin: Warm and dry       Scheduled Meds:     aspirin, 325 mg, Oral, Daily   Or  aspirin, 300 mg, Rectal, Daily  atorvastatin, 80 mg, Oral, Nightly  mirtazapine, 7.5 mg, Oral, Nightly  polyethylene glycol, 17 g, Oral, Daily  senna-docusate sodium, 2 tablet, Oral, BID  sodium chloride, 10 mL, Intravenous, Q12H  sodium chloride, 10 mL, Intravenous, Q12H  timolol, 1 drop, Both Eyes, Q PM      IV Meds:   sodium chloride, 125 mL/hr, Last Rate: 125 mL/hr (07/17/23 2243)        Results Reviewed:   I have personally reviewed the results from the time of this admission to 7/18/2023 14:04 EDT     Lab Results   Component Value Date    GLUCOSE 89 07/18/2023    CALCIUM 10.0 (H) 07/18/2023     07/18/2023    K 4.0 07/18/2023    CO2 24.0 07/18/2023     07/18/2023    BUN 34 (H) 07/18/2023    CREATININE 1.89 (H) 07/18/2023    BCR 18.0 07/18/2023    ANIONGAP 10.0 07/18/2023      Lab Results   Component Value Date    ALBUMIN 4.3 07/17/2023           Assessment / Plan       Right sided weakness    Primary hypertension    Acute renal failure    Stage 3 chronic kidney disease    Anemia    HLD (hyperlipidemia)      ASSESSMENT:  MY versus MY/CKD versus stable CKD, with abnormal SCR 1.7 in March this year.  May be a prerenal component due to lackluster appetite while  taking ARB and diuretic; also contribution from urinary retention.  Potassium and anion gap are normal.  Urinalysis is bland  Urinary retention; immobility and recent constipation contributing  Right arm and right leg weakness  Anemia  Hypertension    PLAN:  1.  IVF for now, though lower rate  2.  Add Flomax  3.  If SCR same or better tomorrow, will likely resume losartan and Bumex    Thank you for involving us in the care of Nino Kat.  Please feel free to call with any questions.    Surjit Hurt MD  07/18/23  14:04 EDT    [facility]  [phone]      Please note that portions of this note were completed with a voice recognition program.

## 2023-07-18 NOTE — PROGRESS NOTES
Dedicated to Hospital Care    [phone]   LOS: 1 day     Name: Nino Kat  Age/Sex: 89 y.o. male  :  1930        PCP: Bkaari Selby MD  Chief Complaint   Patient presents with    Extremity Weakness      Subjective   He denies new issues this morning.  His symptoms started back around the end of March beginning of April.  He was diagnosed with shingles that affected his vision.  Following that he had increased weakness and fatigue.  His weakness and symptoms have not improved over that time.  He feels like his weakness is more so on his right side.  It is a little bit unclear exactly what changed and prompted him to come to the hospital yesterday but it sounds as though his doctor at his assisted living facility evaluated him and felt he might be having a stroke despite having 6 months of symptoms and sent him to the emergency room to be evaluated.  General: No Fever or Chills, Cardiac: No Chest Pain or Palpitations, Resp: No Cough or SOA, GI: No Nausea, Vomiting, or Diarrhea, and Other: No bleeding    aspirin, 325 mg, Oral, Daily   Or  aspirin, 300 mg, Rectal, Daily  atorvastatin, 80 mg, Oral, Nightly  mirtazapine, 7.5 mg, Oral, Nightly  polyethylene glycol, 17 g, Oral, Daily  senna-docusate sodium, 2 tablet, Oral, BID  sodium chloride, 10 mL, Intravenous, Q12H  sodium chloride, 10 mL, Intravenous, Q12H  timolol, 1 drop, Both Eyes, Q PM      sodium chloride, 125 mL/hr, Last Rate: 125 mL/hr (23 2243)        Objective   Vital Signs  Temp:  [97.4 °F (36.3 °C)-98.2 °F (36.8 °C)] 98.2 °F (36.8 °C)  Heart Rate:  [57-72] 60  Resp:  [18-20] 20  BP: (131-175)/() 168/89  Body mass index is 25.79 kg/m².    Intake/Output Summary (Last 24 hours) at 2023 1049  Last data filed at 2023 0924  Gross per 24 hour   Intake 1340 ml   Output 450 ml   Net 890 ml       Physical Exam  Vitals and nursing note reviewed.   Constitutional:       General: He is not in acute distress.     Appearance:  Normal appearance. He is not ill-appearing.   Cardiovascular:      Rate and Rhythm: Normal rate and regular rhythm.   Pulmonary:      Effort: No respiratory distress.      Breath sounds: Normal breath sounds.   Abdominal:      General: Bowel sounds are normal. There is no distension.      Palpations: Abdomen is soft.   Neurological:      General: No focal deficit present.      Mental Status: He is alert and oriented to person, place, and time. Mental status is at baseline.         Results Review:       I reviewed the patient's new clinical results.  Results from last 7 days   Lab Units 07/18/23  0824 07/17/23  1028   WBC 10*3/mm3 9.05 8.01   HEMOGLOBIN g/dL 11.2* 11.2*   PLATELETS 10*3/mm3 168 176     Results from last 7 days   Lab Units 07/18/23  0824 07/17/23  1028   SODIUM mmol/L 141 138   POTASSIUM mmol/L 4.0 4.2   CHLORIDE mmol/L 107 101   CO2 mmol/L 24.0 28.0   BUN mg/dL 34* 31*   CREATININE mg/dL 1.89* 1.83*   CALCIUM mg/dL 10.0* 10.8*   Estimated Creatinine Clearance: 25.8 mL/min (A) (by C-G formula based on SCr of 1.89 mg/dL (H)).      Assessment & Plan   Active Hospital Problems    Diagnosis  POA    **Right sided weakness [R53.1]  Yes    Acute renal failure [N17.9]  Unknown    Primary hypertension [I10]  Yes      Resolved Hospital Problems   No resolved problems to display.       PLAN  This is a 89-year-old gentleman with a history of hypertension chronic kidney disease and recent shingles infection who presents with weakness and was admitted over concern for possible stroke  -On my exam I do not really notice much of a difference between his right and left side as far as his strength in the bed.  We will see how he does with therapy to see if there is really a noticeable difference between the right and left side with ambulation.  -His MRIs independently reviewed and I do not see any evidence of acute stroke.  Discussed this with the patient at the bedside.  We will follow-up neurology's recommendations  and the neuroradiologist read of the films.  -He does note to have an increase in his creatinine over his baseline.  It is unclear whether this represents acute renal failure or could be related to chronic progression.  He is also noted to have an elevated calcium that would support some volume depletion.  Have sent off labs to work-up his renal dysfunction and asked nephrology to evaluate.  Given the elevated calcium levels I have ordered a PTH as well.  -His home antihypertensives have been held due to the acute renal failure.  We can start him on some as needed hydralazine for blood pressures greater than 160 but currently does not appear this will be needed.  -Mechanical DVT prophylaxis  -Full code    Disposition  Expected discharge date/ time has not been documented.   He could be ready to go as early as tomorrow.  He needs to be seen by therapies and cleared by neurology.  They do have home health therapy at his facility.  This could be arranged at discharge.  He seems pretty motivated    Carlos Bowers MD  [facility]  07/18/23  10:49 EDT

## 2023-07-18 NOTE — PLAN OF CARE
Goal Outcome Evaluation:  Plan of Care Reviewed With: patient        Progress: improving. Pt is alert and oriented times three. Pt VS are WNL. Pt is compliant with care and medication. Pt went down for an MRI. Pt had adequate output overnight. Pt care is on going.

## 2023-07-18 NOTE — PLAN OF CARE
Goal Outcome Evaluation:              Outcome Evaluation: No acute infarct on MRI, patient passed RN swallow screen. SLP to sign off.

## 2023-07-18 NOTE — PLAN OF CARE
Goal Outcome Evaluation:  Plan of Care Reviewed With: patient           Outcome Evaluation: Pt is a 89 [facility]ided weakness. CT and MRI of brain show now acute infarct. Pt with reports of recent shingles dx that affected his R side. Pt received in bed upon arrival and agreeable to PT eval. Pt reports he lives at an [facility] and is (I) with mobility but just recently started using a RW/rollator to ambulate. Pt presents to PT with generalized weakness (R > L) and decreased endurance. Pt completed bed mobility with SBA. Pt stood and ambulated 175' c RW requiring CGA. Pt demo's a slow pace and was mildly unsteady but had no overt LOB. PT notified RN to find recliner so pt can be UIC throughout the day. PT recommends pt return to [facility] and f/u with HHPT at D/C.      Anticipated Discharge Disposition (PT): assisted living, home with home health

## 2023-07-18 NOTE — PROGRESS NOTES
Received request for stroke screening per [facility] Stroke Order set.  Rehab Adm Nurses will review periodically to see if full acute Rehab evaluation is appropriate.  If acute care staff feel patient is appropriate for full acute Rehab evaluation now--please call referral to [phone].   Thank you--Rosa Al,  Rehab Adm. Coordinator

## 2023-07-18 NOTE — THERAPY EVALUATION
Patient Name: Nino Kat  : 1930    MRN: [id number]                              Today's Date: 2023       Admit Date: 2023    Visit Dx:     ICD-10-CM ICD-9-CM   1. Right sided weakness  R53.1 728.87   2. Acute renal failure, unspecified acute renal failure type  N17.9 584.9     Patient Active Problem List   Diagnosis    Abnormal EKG    Primary hypertension    Right sided weakness    Acute renal failure    Stage 3 chronic kidney disease    Anemia    HLD (hyperlipidemia)     Past Medical History:   Diagnosis Date    Hypertension     Rotator cuff disorder     tear and repaired both arms     Past Surgical History:   Procedure Laterality Date    ROTATOR CUFF REPAIR Left     TOTAL HIP ARTHROPLASTY Left       General Information       Row Name 23 142          Physical Therapy Time and Intention    Document Type evaluation  -CS     Mode of Treatment co-treatment;physical therapy;occupational therapy  -CS       Row Name 23 1427          General Information    Patient Profile Reviewed yes  -CS     Prior Level of Function independent:;gait;bed mobility;transfer  recently started using a RW & rollator  -CS     Existing Precautions/Restrictions fall  -CS     Barriers to Rehab none identified  -CS       Row Name 23 142          Living Environment    People in Home facility resident  [facility]  -CS       Row Name 23 1427          Home Main Entrance    Number of Stairs, Main Entrance none  -CS       Row Name 23 1427          Stairs Within Home, Primary    Number of Stairs, Within Home, Primary none  -CS       Row Name 23 1427          Cognition    Orientation Status (Cognition) oriented x 3  -CS       Row Name 23 1427          Safety Issues, Functional Mobility    Impairments Affecting Function (Mobility) balance;endurance/activity tolerance;strength  -CS               User Key  (r) = Recorded By, (t) = Taken By, (c) = Cosigned By      Initials Name Provider Type    CS  Milagro Simon, PT Physical Therapist                   Mobility       Row Name 07/18/23 1431          Bed Mobility    Bed Mobility supine-sit;sit-supine  -CS     Supine-Sit [place] (Bed Mobility) standby assist  -CS     Sit-Supine [place] (Bed Mobility) standby assist  -CS     Assistive Device (Bed Mobility) head of bed elevated  -CS     Comment, (Bed Mobility) increased time to complete  -CS       Row Name 07/18/23 1431          Sit-Stand Transfer    Sit-Stand [place] (Transfers) contact guard;verbal cues  -CS     Assistive Device (Sit-Stand Transfers) walker, front-wheeled  -CS     Comment, (Sit-Stand Transfer) VC for hand placement  -CS       Row Name 07/18/23 1431          Gait/Stairs (Locomotion)    [place] Level (Gait) contact guard;verbal cues  -CS     Assistive Device (Gait) walker, front-wheeled  -CS     Distance in Feet (Gait) 175'  -CS     Deviations/Abnormal Patterns (Gait) zenaida decreased;gait speed decreased;festinating/shuffling;stride length decreased  -CS     Bilateral Gait Deviations heel strike decreased  -CS     [place] Level (Stairs) not tested  -CS     Comment, (Gait/Stairs) slow pace; mildly unsteady but no overt LOB  -CS               User Key  (r) = Recorded By, (t) = Taken By, (c) = Cosigned By      Initials Name Provider Type    CS Milagro Simon, SIOBHAN Physical Therapist                   Obj/Interventions       Row Name 07/18/23 1432          Range of Motion Comprehensive    General Range of Motion bilateral lower extremity ROM WFL  -       Row Name 07/18/23 1432          Strength Comprehensive (MMT)    General Manual Muscle Testing (MMT) Assessment other (see comments);lower extremity strength deficits identified  -CS     Comment, General Manual Muscle Testing (MMT) Assessment R LE 3+/5; L LE 4/5  -CS       Row Name 07/18/23 1432          Balance    Balance Assessment sitting static balance;sitting dynamic balance;standing static balance;standing dynamic  balance  -CS     Static Sitting Balance standby assist  -CS     Dynamic Sitting Balance standby assist  -CS     Position, Sitting Balance unsupported;sitting edge of bed  -CS     Static Standing Balance contact guard  -CS     Dynamic Standing Balance contact guard  -CS     Position/Device Used, Standing Balance supported;walker, front-wheeled  -CS               User Key  (r) = Recorded By, (t) = Taken By, (c) = Cosigned By      Initials Name Provider Type    CS Milagro Simon, PT Physical Therapist                   Goals/Plan       Row Name 07/18/23 1437          Bed Mobility Goal 1 (PT)    Activity/Assistive Device (Bed Mobility Goal 1, PT) bed mobility activities, all  -CS     [place] Level/Cues Needed (Bed Mobility Goal 1, PT) supervision required  -CS     Time Frame (Bed Mobility Goal 1, PT) 1 week  -CS       Row Name 07/18/23 1437          Transfer Goal 1 (PT)    Activity/Assistive Device (Transfer Goal 1, PT) sit-to-stand/stand-to-sit;bed-to-chair/chair-to-bed  -CS     [place] Level/Cues Needed (Transfer Goal 1, PT) standby assist  -CS     Time Frame (Transfer Goal 1, PT) 1 week  -CS       Row Name 07/18/23 1437          Gait Training Goal 1 (PT)    Activity/Assistive Device (Gait Training Goal 1, PT) gait (walking locomotion);assistive device use;decrease fall risk;improve balance and speed;increase endurance/gait distance  -CS     [place] Level (Gait Training Goal 1, PT) standby assist  -CS     Distance (Gait Training Goal 1, PT) 150'  -CS     Time Frame (Gait Training Goal 1, PT) 1 week  -CS               User Key  (r) = Recorded By, (t) = Taken By, (c) = Cosigned By      Initials Name Provider Type    CS Milagro Simon, PT Physical Therapist                   Clinical Impression       Row Name 07/18/23 1432          Pain    Pretreatment Pain Rating 0/10 - no pain  -CS     Posttreatment Pain Rating 0/10 - no pain  -CS       Row Name 07/18/23 1432          Plan of Care Review    Plan of  Care Reviewed With patient  -CS     Outcome Evaluation [facility] 89 [facility]. CT and MRI of brain show now acute infarct. Pt with reports of recent shingles dx that affected his R side. Pt received in bed upon arrival and agreeable to PT eval. Pt reports he lives at an [facility] and is (I) with mobility but just recently started using a RW/rollator to ambulate. Pt presents to PT with generalized weakness (R > L) and decreased endurance. Pt completed bed mobility with SBA. Pt stood and ambulated 175' c RW requiring CGA. Pt demo's a slow pace and was mildly unsteady but had no overt LOB. PT notified RN to find recliner so pt can be UIC throughout the day. PT recommends pt return to [facility] and f/u with HHPT at D/C.  -CS       Row Name 07/18/23 1432          Therapy Assessment/Plan (PT)    Patient/Family Therapy Goals Statement (PT) to return home  -CS     Rehab Potential (PT) good, to achieve stated therapy goals  -CS     Criteria for Skilled Interventions Met (PT) yes;meets criteria  -CS     Therapy Frequency (PT) 5 times/wk  -CS       Row Name 07/18/23 1432          Positioning and Restraints    Pre-Treatment Position in bed  -CS     Post Treatment Position bed  -CS     In Bed notified nsg;fowlers;call light within reach;encouraged to call for assist;exit alarm on  -CS               User Key  (r) = Recorded By, (t) = Taken By, (c) = Cosigned By      Initials Name Provider Type    CS Milagro Simon, PT Physical Therapist                   Outcome Measures       Row Name 07/18/23 8498          How much help from another person do you currently need...    Turning from your back to your side while in flat bed without using bedrails? 4  -CS     Moving from lying on back to sitting on the side of a flat bed without bedrails? 3  -CS     Moving to and from a bed to a chair (including a wheelchair)? 3  -CS     Standing up from a chair using your arms (e.g., wheelchair, bedside chair)? 3  -CS      Climbing 3-5 steps with a railing? 2  -CS     To walk in hospital room? 3  -CS     AM-PAC 6 Clicks Score (PT) 18  -CS     Highest level of mobility 6 --> Walked 10 steps or more  -       Row Name 07/18/23 1438          Functional Assessment    Outcome Measure Options AM-PAC 6 Clicks Basic Mobility (PT)  -               User Key  (r) = Recorded By, (t) = Taken By, (c) = Cosigned By      Initials Name Provider Type    CS Milagro Simon, SIOBHAN Physical Therapist                                 Physical Therapy Education       Title: PT OT SLP Therapies (In Progress)       Topic: Physical Therapy (In Progress)       Point: Mobility training (Done)       Learning Progress Summary             Patient Acceptance, E,TB, VU,DU by  at 7/18/2023 1438                         Point: Home exercise program (Not Started)       Learner Progress:  Not documented in this visit.              Point: Body mechanics (Done)       Learning Progress Summary             Patient Acceptance, E,TB, VU,DU by  at 7/18/2023 1438                         Point: Precautions (Done)       Learning Progress Summary             Patient Acceptance, E,TB, VU,DU by  at 7/18/2023 1438                                         User Key       Initials Effective Dates Name Provider Type Discipline     09/22/22 -  Milagro Simon, SIOBHAN Physical Therapist PT                  PT Recommendation and Plan     Plan of Care Reviewed With: patient  Outcome Evaluation: Pt is a 89 y/o M admitted to [facility] with c/o R sided weakness. CT and MRI of brain show now acute infarct. Pt with reports of recent shingles dx that affected his R side. Pt received in bed upon arrival and agreeable to PT eval. Pt reports he lives at an [facility] and is (I) with mobility but just recently started using a RW/rollator to ambulate. Pt presents to PT with generalized weakness (R > L) and decreased endurance. Pt completed bed mobility with SBA. Pt stood and ambulated 175' c RW requiring CGA.  Pt demo's a slow pace and was mildly unsteady but had no overt LOB. PT notified RN to find recliner so pt can be UIC throughout the day. PT recommends pt return to [facility] and f/u with HHPT at D/C.     Time Calculation:         PT Charges       Row Name 07/18/23 1438             Time Calculation    Start Time 1304  -CS      Stop Time 1324  -CS      Time Calculation (min) 20 min  -CS      PT Received On 07/18/23  -CS      PT - Next Appointment 07/19/23  -CS      PT Goal Re-Cert Due Date 07/25/23  -CS         Time Calculation- PT    Total Timed Code Minutes- PT 18 minute(s)  -CS         Timed Charges    49632 - Gait Training Minutes  8  -CS      53767 - PT Therapeutic Activity Minutes 10  -CS         Total Minutes    Timed Charges Total Minutes 18  -CS       Total Minutes 18  -CS                User Key  (r) = Recorded By, (t) = Taken By, (c) = Cosigned By      Initials Name Provider Type    CS Milagro Simon, PT Physical Therapist                  Therapy Charges for Today       Code Description Service Date Service Provider Modifiers Qty    95826413377 HC PT THERAPEUTIC ACT EA 15 MIN 7/18/2023 Milagro Simon, PT GP 1    80076128938 HC PT EVAL MOD COMPLEXITY 3 7/18/2023 Milagro Simon, PT GP 1            PT G-Codes  Outcome Measure Options: AM-PAC 6 Clicks Basic Mobility (PT)  AM-PAC 6 Clicks Score (PT): 18  PT Discharge Summary  Anticipated Discharge Disposition (PT): assisted living, home with home health    Milagro Simon PT  7/18/2023

## 2023-07-18 NOTE — PLAN OF CARE
Problem: Fall Injury Risk  Goal: Absence of Fall and Fall-Related Injury  Intervention: Identify and Manage Contributors  Recent Flowsheet Documentation  Taken 7/18/2023 1000 by Pilar Prado RN  Medication Review/Management: medications reviewed  Intervention: Promote Injury-Free Environment  Recent Flowsheet Documentation  Taken 7/18/2023 1637 by Pilar Prado RN  Safety Promotion/Fall Prevention:   safety round/check completed   nonskid shoes/slippers when out of bed   fall prevention program maintained   assistive device/personal items within reach   activity supervised  Taken 7/18/2023 1400 by Pilar Prado RN  Safety Promotion/Fall Prevention:   activity supervised   nonskid shoes/slippers when out of bed   assistive device/personal items within reach   safety round/check completed  Taken 7/18/2023 1230 by Pilar Prado RN  Safety Promotion/Fall Prevention: activity supervised  Taken 7/18/2023 1000 by Pilar Prado RN  Safety Promotion/Fall Prevention:   activity supervised   fall prevention program maintained   nonskid shoes/slippers when out of bed   safety round/check completed  Taken 7/18/2023 0820 by Pilar Prado RN  Safety Promotion/Fall Prevention:   activity supervised   fall prevention program maintained   nonskid shoes/slippers when out of bed   safety round/check completed     Problem: Fall Injury Risk  Goal: Absence of Fall and Fall-Related Injury  Intervention: Promote Injury-Free Environment  Recent Flowsheet Documentation  Taken 7/18/2023 1637 by Pilar Prado RN  Safety Promotion/Fall Prevention:   safety round/check completed   nonskid shoes/slippers when out of bed   fall prevention program maintained   assistive device/personal items within reach   activity supervised  Taken 7/18/2023 1400 by Pilar Prado RN  Safety Promotion/Fall Prevention:   activity supervised   nonskid shoes/slippers when out of bed   assistive device/personal items within reach    safety round/check completed  Taken 7/18/2023 1230 by Pilar Prado, RN  Safety Promotion/Fall Prevention: activity supervised  Taken 7/18/2023 1000 by Pilar Prado, RN  Safety Promotion/Fall Prevention:   activity supervised   fall prevention program maintained   nonskid shoes/slippers when out of bed   safety round/check completed  Taken 7/18/2023 0820 by Pilar Prado, RN  Safety Promotion/Fall Prevention:   activity supervised   fall prevention program maintained   nonskid shoes/slippers when out of bed   safety round/check completed   Goal Outcome Evaluation:

## 2023-07-18 NOTE — PROGRESS NOTES
[facility] Acute Inpt Rehab Note     Referral received via stroke order set.   MRI negative for acute findings.  Will go ahead and sign off at this time.  Should patient display needs for our services, please call our office at [phone], to initiate a full referral once therapies begin and diagnosis made.    Thank you,   Vannesa Al, RN   Rehab Admission Nurse

## 2023-07-18 NOTE — CONSULTS
Neurology Consult Note    Referring Provider: Dr. Hawk/Dr. Simon  Reason for Consultation: stroke like symptoms    History of present illness:    The patient is a 89 year old man who presented to the ED yesterday with right sided weakness. Onset of symptoms was several months ago with increase over 48 hours prior to arrival. He has not fallen. Head CT showed no acute pathology.    He reports that symptoms started suddenly in early April after a severe outbreak of shingles in the left eye. He had a few sessions with physical therapy at his half-way that he felt were no helpful.     He reports no change in vision, no headache, no vertigo, no change in speech or language.  He has had no neck pain and no back pain.    He has completed brain MRI, PT and OT evaluations.    The patient is on 81 mg daily at home.     He lives an assisted living facility    Past Medical History  Past Medical History:   Diagnosis Date    Hypertension     Rotator cuff disorder     tear and repaired both arms       Past Surgical History  Past Surgical History:   Procedure Laterality Date    ROTATOR CUFF REPAIR Left     TOTAL HIP ARTHROPLASTY Left        Allergies   Allergen Reactions    Diclofenac Hives and Rash     Gel     Social History  , former tobacco use, no alcohol use    Review of Systems   HENT:  Positive for hearing loss.      Medications  Scheduled Meds:aspirin, 325 mg, Oral, Daily   Or  aspirin, 300 mg, Rectal, Daily  atorvastatin, 80 mg, Oral, Nightly  mirtazapine, 7.5 mg, Oral, Nightly  polyethylene glycol, 17 g, Oral, Daily  senna-docusate sodium, 2 tablet, Oral, BID  sodium chloride, 10 mL, Intravenous, Q12H  sodium chloride, 10 mL, Intravenous, Q12H  timolol, 1 drop, Both Eyes, Q PM      Continuous Infusions:sodium chloride, 125 mL/hr, Last Rate: 125 mL/hr (07/17/23 3324)      PRN Meds:.  acetaminophen **OR** acetaminophen **OR** acetaminophen    senna-docusate sodium **AND** polyethylene glycol **AND** bisacodyl **AND**  bisacodyl    ondansetron **OR** ondansetron    [COMPLETED] Insert Peripheral IV **AND** sodium chloride    sodium chloride    sodium chloride    sodium chloride    sodium chloride    Vital Signs   Temp:  [97.4 °F (36.3 °C)-98.2 °F (36.8 °C)] 97.8 °F (36.6 °C)  Heart Rate:  [57-72] 61  Resp:  [18-20] 20  BP: (139-175)/() 171/94    Examination:  General: Well-groomed, well-nourished  HEENT:  Neck supple, no rash   CVS:  Regular rate and rhythm. Good peripheral perfusion.   Resp: Unlabored breathing  Extremities:  No signs of peripheral edema  Skin:  No rash  Neurologic:   Alert oriented and fluent  No dysarthria  EOMF without nystagmus  Pupils symmetric and equally reactive  Face symmetric  Normal power in BUE, no pronator drift, but decreased right FFM  RLE hip flexion  3/5, ankle flexton 4+/5  Normal coordination  Reflexes symmetric and not hyperactive  Plantar responses flexor  Sensory exam grossly intact  Gait not tested  Psychiatric: no anxiety    Results Review:  Results from last 7 days   Lab Units 07/18/23  0824 07/17/23  1028   WBC 10*3/mm3 9.05 8.01   HEMOGLOBIN g/dL 11.2* 11.2*   HEMATOCRIT % 33.1* 33.3*   PLATELETS 10*3/mm3 168 176     Results from last 7 days   Lab Units 07/18/23  0824 07/17/23  1028   SODIUM mmol/L 141 138   POTASSIUM mmol/L 4.0 4.2   CHLORIDE mmol/L 107 101   CO2 mmol/L 24.0 28.0   BUN mg/dL 34* 31*   CREATININE mg/dL 1.89* 1.83*   CALCIUM mg/dL 10.0* 10.8*   BILIRUBIN mg/dL  --  0.4   ALK PHOS U/L  --  86   ALT (SGPT) U/L  --  14   AST (SGOT) U/L  --  19   GLUCOSE mg/dL 89 98     Cholesterol 170  Triglycerides 69  LDL  113    A1c  5.6       Lab Results   Component Value Date    ACPTRUZN19 1,405 (H) 07/18/2023     Lab Results   Component Value Date    TSH 1.050 07/18/2023     Radiology  images reviewed independently  Head CT showed no acute pathology    Brain MRI showed no acute ischemia with moderate atrophy and 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    MRA head and neck is a sub-optimal study and several areas cannot be visualized especially in the posterior circulation. The carotids show no significant stenosis.    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 A small stroke that occurred 3 months ago would appear chronic on brain MRI.     Occurred on aspirin 81 mg     Vessel imaging shows no intervenable lesions.    Echo without embolic source    Recommend increasing aspirin to 325 mg daily, He is out of the window for DAPT (21- 90 days)     High intensity statin for . Monitor CPK and LFTs in 3 and 6 weeks.     It would be beneficial to have patient undergo aggressive PT in acute rehab.     No need for further inpatient monitoring for acute stroke. May cancel NIHSS.    I discussed these findings and my recommendations with patient and nursing staff    Neurology signing off, please call if needed further.    Kasie Balderrama MD  07/18/23  14:19 EDT    Medical Decision Making for this neurology consultation consists of the following:  Review of previous chart, including H/P, provider and nursing notes as applicable.  Review of medications and vital signs.  Review of previous labs, neuroimaging, and additional relevant diagnostics.   Interpretation of laboratory, imaging, and other diagnostic results  Discussion with other providers and nursing staff.   Total face-to-face/floor time: 60 minutes.

## 2023-07-19 NOTE — PLAN OF CARE
Problem: Adult Inpatient Plan of Care  Goal: Absence of Hospital-Acquired Illness or Injury  Intervention: Identify and Manage Fall Risk  Recent Flowsheet Documentation  Taken 7/19/2023 0753 by Cele Escobedo RN  Safety Promotion/Fall Prevention:   safety round/check completed   fall prevention program maintained     Problem: Fall Injury Risk  Goal: Absence of Fall and Fall-Related Injury  Intervention: Promote Injury-Free Environment  Recent Flowsheet Documentation  Taken 7/19/2023 0753 by Cele Escobedo RN  Safety Promotion/Fall Prevention:   safety round/check completed   fall prevention program maintained   Goal Outcome Evaluation:

## 2023-07-19 NOTE — CONSULTS
FIRST UROLOGY CONSULT      Patient Identification:  NAME:  Nino Kat  Age:  89 y.o.   Sex:  male   :  1930   MRN:  [id number]       Chief complaint: Unable to urinate    History of present illness: This is a 89-year-old gentleman history of benign prostatic hyperplasia was seen in the past but is probably been 5 or 6 years since our last visit.  He has had prior radiofrequency ablation of his prostate back in about .  Had good results of that but over the last several years has had progressive worsening urinary symptoms.  He has a frequency urgency symptoms of incomplete emptying.  Urgency incontinence at times.  He gets up several times at night.  His residuals in house have been very high close to a liter when he was admitted and then 6 to 700 cc over the last day or so.  A catheter was placed.  He has low bit of blood in the urine but it is clearing he has no clots and has had no obstructive catheter.  Does not seem to be too bothered by it at this point.  He was started on Flomax yesterday.  He denies taking this at home and did not really seem to be bothered too much by symptoms prior to this admission.      Past medical history:  Past Medical History:   Diagnosis Date    Hypertension     Rotator cuff disorder     tear and repaired both arms       Past surgical history:  Past Surgical History:   Procedure Laterality Date    ROTATOR CUFF REPAIR Left     TOTAL HIP ARTHROPLASTY Left        Allergies:  Diclofenac    Home medications:  Medications Prior to Admission   Medication Sig Dispense Refill Last Dose    Aspirin Buf,CaCarb-MgCarb-MgO, 81 MG tablet Take 81 mg by mouth Daily.   2023    bumetanide (BUMEX) 1 MG tablet Take 1 tablet by mouth Daily.   2023    Calcium Carbonate-Vit D-Min (Calcium 600 + Minerals) 600-200 MG-UNIT tablet Take 1 tablet by mouth 2 (Two) Times a Day With Meals.   2023    docusate sodium (COLACE) 100 MG capsule Take 1 capsule by mouth 2 (Two) Times  a Day As Needed.       losartan (COZAAR) 100 MG tablet Take 1 tablet by mouth Every Night.       mirtazapine (REMERON) 7.5 MG tablet Take 1 tablet by mouth Every Night.       multivitamin (THERAGRAN) tablet tablet Take 1 tablet by mouth Daily.       Olopatadine HCl 0.7 % solution Administer 1 drop to both eyes 2 (Two) Times a Day.       ondansetron (ZOFRAN) 4 MG tablet Take 1 tablet by mouth Every 6 (Six) Hours As Needed for Nausea or Vomiting.       polyethylene glycol (MIRALAX) 17 g packet Take 17 g by mouth Daily.       timolol (TIMOPTIC) 0.5 % ophthalmic solution Administer 1 drop to both eyes Every Evening.       valACYclovir (VALTREX) 500 MG tablet Take 1 tablet by mouth 2 (Two) Times a Day.       vitamin B-12 (CYANOCOBALAMIN) 1000 MCG tablet Take 1 tablet by mouth Daily.   2023    magnesium citrate 1.745 GM/30ML solution solution Take 300 mL by mouth 1 (One) Time.           Hospital medications:  amLODIPine, 5 mg, Oral, Q24H  aspirin, 325 mg, Oral, Daily   Or  aspirin, 300 mg, Rectal, Daily  atorvastatin, 80 mg, Oral, Nightly  lactulose, 10 g, Oral, Daily  mirtazapine, 7.5 mg, Oral, Nightly  senna-docusate sodium, 2 tablet, Oral, BID  sodium chloride, 10 mL, Intravenous, Q12H  sodium chloride, 10 mL, Intravenous, Q12H  tamsulosin, 0.4 mg, Oral, BID  timolol, 1 drop, Both Eyes, Q PM           acetaminophen **OR** acetaminophen **OR** acetaminophen    senna-docusate sodium **AND** polyethylene glycol **AND** bisacodyl **AND** bisacodyl    ondansetron **OR** ondansetron    [COMPLETED] Insert Peripheral IV **AND** sodium chloride    sodium chloride    sodium chloride    sodium chloride    sodium chloride    Family history:  Family History   Family history unknown: Yes       Social history:  Social History     Tobacco Use    Smoking status: Former     Types: Cigarettes     Quit date:      Years since quittin.5    Smokeless tobacco: Never   Vaping Use    Vaping Use: Never used   Substance Use Topics     Alcohol use: Not Currently     Comment: OCCASIONAL    Drug use: Never       Review of systems:    Negative 12-system ROS except for the following: No cardiac or pulmonary complaints.      Objective:  TMax 24 hours:   Temp (24hrs), Av.1 °F (36.7 °C), Min:97.2 °F (36.2 °C), Max:98.6 °F (37 °C)      Vitals Ranges:   Temp:  [97.2 °F (36.2 °C)-98.6 °F (37 °C)] 98.6 °F (37 °C)  Heart Rate:  [58-71] 69  Resp:  [18-20] 18  BP: (147-166)/(79-88) 147/88    Intake/Output Last 3 shifts:  I/O last 3 completed shifts:  In: 4653 [P.O.:960; I.V.:3693]  Out: 3800 [Urine:3800]     Physical Exam:       General Appearance:    Alert, cooperative, in no acute distress   Head:    Normocephalic, without obvious abnormality, atraumatic   Eyes:          PERRL, conjunctivae and corneas clear   Ears:    Normal external inspection   Throat:   No oral lesions, oral mucosa moist   Neck:   Supple, no LAD, trachea midline   Back:     No CVA tenderness   Lungs:     Respirations unlabored, symmetric excursion    Heart:    RRR, intact peripheral pulses   Abdomen:   Soft and benign   :  Penis normal normal testes normal   JESSICA:  Extremities: No fecal impaction..  Prostate a little hard to feel but slightly enlarged  No edema, no deformity   Skin:   No bleeding, bruising or rashes   Neuro/Psych:   Orientation intact, mood/affect pleasant, no focal findings       Results review:   I reviewed the patient's new clinical results.    Data review:  Lab Results (last 24 hours)       Procedure Component Value Units Date/Time    POC Glucose Once [[id number]]  (Normal) Collected: 23 1221    Specimen: Blood Updated: 23 1222     Glucose 130 mg/dL      Comment: Meter: [id number] : [id number] Mendoza MURO       Renal Function Panel [[id number]]  (Abnormal) Collected: 23 07    Specimen: Blood Updated: 23     Glucose 92 mg/dL      BUN 30 mg/dL      Creatinine 1.58 mg/dL      Sodium 142 mmol/L      Potassium 4.0 mmol/L       Chloride 111 mmol/L      CO2 21.0 mmol/L      Calcium 8.7 mg/dL      Albumin 3.3 g/dL      Phosphorus 2.6 mg/dL      Anion Gap 10.0 mmol/L      BUN/Creatinine Ratio 19.0     eGFR 40.5 mL/min/1.73     Narrative:      GFR Normal >60  Chronic Kidney Disease <60  Kidney Failure <15    The GFR formula is only valid for adults with stable renal function between ages 18 and 70.    Magnesium [[id number]]  (Normal) Collected: 07/19/23 0725    Specimen: Blood Updated: 07/19/23 0920     Magnesium 1.8 mg/dL     CBC & Differential [[id number]]  (Abnormal) Collected: 07/19/23 0725    Specimen: Blood Updated: 07/19/23 0813    Narrative:      The following orders were created for panel order CBC & Differential.  Procedure                               Abnormality         Status                     ---------                               -----------         ------                     CBC Auto Differential[[id number]]        Abnormal            Final result                 Please view results for these tests on the individual orders.    CBC Auto Differential [[id number]]  (Abnormal) Collected: 07/19/23 0725    Specimen: Blood Updated: 07/19/23 0813     WBC 7.94 10*3/mm3      RBC 2.79 10*6/mm3      Hemoglobin 9.9 g/dL      Hematocrit 28.9 %      .6 fL      MCH 35.5 pg      MCHC 34.3 g/dL      RDW 13.8 %      RDW-SD 52.5 fl      MPV 12.3 fL      Platelets 138 10*3/mm3      Neutrophil % 74.5 %      Lymphocyte % 14.4 %      Monocyte % 7.4 %      Eosinophil % 2.4 %      Basophil % 0.8 %      Immature Grans % 0.5 %      Neutrophils, Absolute 5.92 10*3/mm3      Lymphocytes, Absolute 1.14 10*3/mm3      Monocytes, Absolute 0.59 10*3/mm3      Eosinophils, Absolute 0.19 10*3/mm3      Basophils, Absolute 0.06 10*3/mm3      Immature Grans, Absolute 0.04 10*3/mm3      nRBC 0.0 /100 WBC              Imaging:  Imaging Results (Last 24 Hours)       Procedure Component Value Units Date/Time     Renal Bilateral [[id number]] Collected: 07/18/23 2293      Updated: 07/18/23 1900    Narrative:      US RENAL BILATERAL-clinical: Acute renal insufficiency     FINDINGS: The right kidney is 9.2 cm in length and the left kidney is 11  cm in length. Neither the right or left ureteral jet could be documented  with limited bladder survey. Suggestion of mild debris within the  dependent portion of the bladder. Bladder was moderately distended.     Due to the patient's body habitus, shadowing obscures a large part of  the left kidney. There is some prominence of the renal pelvis however no  definite associated caliectasis. Perhaps prominent extrarenal pelvis,  anatomic variant. Mild hydronephrosis not excluded.     No right-sided obstructive uropathy.     No definite right or left renal mass seen.     The renal cortical echotexture appears greater than anticipated, likely  related to medical renal disease. The remainder is unremarkable.     This report was finalized on 7/18/2023 6:56 PM by Dr. Emile Hanna M.D.       MRI Brain Without Contrast [[id number]] Collected: 07/18/23 0739     Updated: 07/18/23 1655    Narrative:      MRI EXAMINATION OF THE BRAIN WITHOUT CONTRAST AND MRA OF THE NECK AND  HEAD WITHOUT CONTRAST     HISTORY: Fall, right extremity weakness.     COMPARISON: CT head 07/17/2023.     MRI EXAMINATION OF THE BRAIN WITHOUT CONTRAST:     TECHNIQUE: A MRI examination of the brain was performed utilizing  sagittal T1, axial diffusion, T1, T2, T2 FLAIR and gradient echo T2  imaging.     FINDINGS: There is age-appropriate atrophy. There is no evidence of  restricted diffusion to suggest acute infarction. There is expected  flow-void in the basilar artery and in the distal aspect of the internal  carotid arteries bilaterally on the axial T2 sequence. Increased signal  intensity is noted involving the periventricular white matter cerebral  hemispheres bilaterally and to a lesser extent involving the ludwig  centrally and the deep white matter of cerebral hemispheres  bilaterally.  The appearance is consistent with mild-to-moderate small vessel ischemic  disease. There is no evidence of mass or mass effect on this  noncontrasted MRI examination of the brain.       Impression:      Atrophy and mild-to-moderate small vessel ischemic disease  is noted. There is no evidence of acute infarction.        MRA OF THE HEAD WITHOUT CONTRAST:      The left vertebral artery is dominant. There is absent signal within the  hypoplastic right V4 segment. The basilar artery and the proximal  aspects of the posterior cerebral arteries appear unremarkable.     There is signal present within the distal aspect of the internal carotid  arteries bilaterally. The proximal aspects of the anterior and middle  arteries appear unremarkable. The posterior communicating arteries are  markedly hypoplastic or atretic.     IMPRESSION: There is an isolated posterior circulation supplied by the  left vertebral artery. There is absent signal within the hypoplastic  right V4 segment. This may be secondary to slow flow or occlusion.  Further evaluation could be performed with a CT angiogram of the neck  and head. See above.        MRA OF THE NECK WITHOUT CONTRAST:      The study is hampered by patient motion. The great vessels are arranged  in a classic configuration. The origin of the dominant left vertebral  artery is not well assessed. There is a suggestion of signal loss  involving the left vertebral artery shortly beyond its origin. This may  be secondary to tortuosity, but slow flow or stenosis cannot be  excluded. There is 0% stenosis of the right internal carotid artery  using NASCET criteria. There is signal loss involving the proximal  aspect of the left vertebral artery which may be artifactual secondary  to in plane flow artifact.     IMPRESSION: The study is limited by patient motion and lack of contrast.  The left vertebral artery is dominant. There is signal loss involving  the proximal aspects of the  left vertebral artery shortly beyond its  origin. There is also signal loss involving the proximal aspect of the  right internal carotid artery. This may be artifactual. Stenosis cannot  be excluded. Further evaluation with a CT angiogram of the neck and head  is recommended.     This report was finalized on 7/18/2023 4:52 PM by Dr. Carlos Fuentes M.D.       MRI Angiogram Head Without Contrast [[id number]] Collected: 07/18/23 0739     Updated: 07/18/23 1655    Narrative:      MRI EXAMINATION OF THE BRAIN WITHOUT CONTRAST AND MRA OF THE NECK AND  HEAD WITHOUT CONTRAST     HISTORY: Fall, right extremity weakness.     COMPARISON: CT head 07/17/2023.     MRI EXAMINATION OF THE BRAIN WITHOUT CONTRAST:     TECHNIQUE: A MRI examination of the brain was performed utilizing  sagittal T1, axial diffusion, T1, T2, T2 FLAIR and gradient echo T2  imaging.     FINDINGS: There is age-appropriate atrophy. There is no evidence of  restricted diffusion to suggest acute infarction. There is expected  flow-void in the basilar artery and in the distal aspect of the internal  carotid arteries bilaterally on the axial T2 sequence. Increased signal  intensity is noted involving the periventricular white matter cerebral  hemispheres bilaterally and to a lesser extent involving the ludwig  centrally and the deep white matter of cerebral hemispheres bilaterally.  The appearance is consistent with mild-to-moderate small vessel ischemic  disease. There is no evidence of mass or mass effect on this  noncontrasted MRI examination of the brain.       Impression:      Atrophy and mild-to-moderate small vessel ischemic disease  is noted. There is no evidence of acute infarction.        MRA OF THE HEAD WITHOUT CONTRAST:      The left vertebral artery is dominant. There is absent signal within the  hypoplastic right V4 segment. The basilar artery and the proximal  aspects of the posterior cerebral arteries appear unremarkable.     There is signal  present within the distal aspect of the internal carotid  arteries bilaterally. The proximal aspects of the anterior and middle  arteries appear unremarkable. The posterior communicating arteries are  markedly hypoplastic or atretic.     IMPRESSION: There is an isolated posterior circulation supplied by the  left vertebral artery. There is absent signal within the hypoplastic  right V4 segment. This may be secondary to slow flow or occlusion.  Further evaluation could be performed with a CT angiogram of the neck  and head. See above.        MRA OF THE NECK WITHOUT CONTRAST:      The study is hampered by patient motion. The great vessels are arranged  in a classic configuration. The origin of the dominant left vertebral  artery is not well assessed. There is a suggestion of signal loss  involving the left vertebral artery shortly beyond its origin. This may  be secondary to tortuosity, but slow flow or stenosis cannot be  excluded. There is 0% stenosis of the right internal carotid artery  using NASCET criteria. There is signal loss involving the proximal  aspect of the left vertebral artery which may be artifactual secondary  to in plane flow artifact.     IMPRESSION: The study is limited by patient motion and lack of contrast.  The left vertebral artery is dominant. There is signal loss involving  the proximal aspects of the left vertebral artery shortly beyond its  origin. There is also signal loss involving the proximal aspect of the  right internal carotid artery. This may be artifactual. Stenosis cannot  be excluded. Further evaluation with a CT angiogram of the neck and head  is recommended.     This report was finalized on 7/18/2023 4:52 PM by Dr. Carlos Fuentes M.D.       MRI Angiogram Neck Without Contrast [[id number]] Collected: 07/18/23 0739     Updated: 07/18/23 1655    Narrative:      MRI EXAMINATION OF THE BRAIN WITHOUT CONTRAST AND MRA OF THE NECK AND  HEAD WITHOUT CONTRAST     HISTORY: Fall, right  extremity weakness.     COMPARISON: CT head 07/17/2023.     MRI EXAMINATION OF THE BRAIN WITHOUT CONTRAST:     TECHNIQUE: A MRI examination of the brain was performed utilizing  sagittal T1, axial diffusion, T1, T2, T2 FLAIR and gradient echo T2  imaging.     FINDINGS: There is age-appropriate atrophy. There is no evidence of  restricted diffusion to suggest acute infarction. There is expected  flow-void in the basilar artery and in the distal aspect of the internal  carotid arteries bilaterally on the axial T2 sequence. Increased signal  intensity is noted involving the periventricular white matter cerebral  hemispheres bilaterally and to a lesser extent involving the ludwig  centrally and the deep white matter of cerebral hemispheres bilaterally.  The appearance is consistent with mild-to-moderate small vessel ischemic  disease. There is no evidence of mass or mass effect on this  noncontrasted MRI examination of the brain.       Impression:      Atrophy and mild-to-moderate small vessel ischemic disease  is noted. There is no evidence of acute infarction.        MRA OF THE HEAD WITHOUT CONTRAST:      The left vertebral artery is dominant. There is absent signal within the  hypoplastic right V4 segment. The basilar artery and the proximal  aspects of the posterior cerebral arteries appear unremarkable.     There is signal present within the distal aspect of the internal carotid  arteries bilaterally. The proximal aspects of the anterior and middle  arteries appear unremarkable. The posterior communicating arteries are  markedly hypoplastic or atretic.     IMPRESSION: There is an isolated posterior circulation supplied by the  left vertebral artery. There is absent signal within the hypoplastic  right V4 segment. This may be secondary to slow flow or occlusion.  Further evaluation could be performed with a CT angiogram of the neck  and head. See above.        MRA OF THE NECK WITHOUT CONTRAST:      The study is  hampered by patient motion. The great vessels are arranged  in a classic configuration. The origin of the dominant left vertebral  artery is not well assessed. There is a suggestion of signal loss  involving the left vertebral artery shortly beyond its origin. This may  be secondary to tortuosity, but slow flow or stenosis cannot be  excluded. There is 0% stenosis of the right internal carotid artery  using NASCET criteria. There is signal loss involving the proximal  aspect of the left vertebral artery which may be artifactual secondary  to in plane flow artifact.     IMPRESSION: The study is limited by patient motion and lack of contrast.  The left vertebral artery is dominant. There is signal loss involving  the proximal aspects of the left vertebral artery shortly beyond its  origin. There is also signal loss involving the proximal aspect of the  right internal carotid artery. This may be artifactual. Stenosis cannot  be excluded. Further evaluation with a CT angiogram of the neck and head  is recommended.     This report was finalized on 7/18/2023 4:52 PM by Dr. Carlos Fuentes M.D.                  Assessment:       Right sided weakness    Primary hypertension    Acute renal failure    Stage 3 chronic kidney disease    Anemia    HLD (hyperlipidemia)    Urinary retention likely chronic and acutely exacerbated with baseline BPH    Plan:     Flomax has been started now when bumped it up to 0.8 mg daily.  Voiding trial in a few days.    Nicolas Seay MD  07/19/23  15:39 EDT

## 2023-07-19 NOTE — PLAN OF CARE
Goal Outcome Evaluation:      VSS. Patient oriented x3, calm and cooperative. Unable to void spontaneously bladder scan and straight cath performed. Obtained 900ml of urine. Moser placed after AM bladder scan result. Urology consult placed. Patient slept in between care.              Problem: Adult Inpatient Plan of Care  Goal: Plan of Care Review  Outcome: Ongoing, Progressing  Goal: Patient-Specific Goal (Individualized)  Outcome: Ongoing, Progressing  Goal: Absence of Hospital-Acquired Illness or Injury  Outcome: Ongoing, Progressing  Intervention: Identify and Manage Fall Risk  Recent Flowsheet Documentation  Taken 7/19/2023 0430 by Marcie Gr RN  Safety Promotion/Fall Prevention: safety round/check completed  Taken 7/19/2023 0200 by Marcie Gr RN  Safety Promotion/Fall Prevention: safety round/check completed  Taken 7/19/2023 0030 by Marcie Gr RN  Safety Promotion/Fall Prevention: safety round/check completed  Taken 7/18/2023 2230 by Marcie Gr RN  Safety Promotion/Fall Prevention: safety round/check completed  Taken 7/18/2023 2022 by Marcie Gr RN  Safety Promotion/Fall Prevention:   activity supervised   assistive device/personal items within reach   clutter free environment maintained   fall prevention program maintained   nonskid shoes/slippers when out of bed   safety round/check completed  Intervention: Prevent Skin Injury  Recent Flowsheet Documentation  Taken 7/19/2023 0430 by Marcie Gr RN  Body Position: position changed independently  Taken 7/19/2023 0200 by Marcie Gr RN  Body Position: position changed independently  Taken 7/19/2023 0030 by Marcie Gr RN  Body Position: position changed independently  Taken 7/18/2023 2230 by Marcie Gr RN  Body Position: position changed independently  Taken 7/18/2023 2022 by Marcie Gr RN  Body Position: position changed independently  Intervention: Prevent and Manage VTE (Venous Thromboembolism)  Risk  Recent Flowsheet Documentation  Taken 7/18/2023 2022 by Marcie Gr RN  Activity Management: activity encouraged  Goal: Optimal Comfort and Wellbeing  Outcome: Ongoing, Progressing  Goal: Readiness for Transition of Care  Outcome: Ongoing, Progressing     Problem: Fall Injury Risk  Goal: Absence of Fall and Fall-Related Injury  Outcome: Ongoing, Progressing  Intervention: Identify and Manage Contributors  Recent Flowsheet Documentation  Taken 7/18/2023 2022 by Marcie Gr RN  Medication Review/Management: medications reviewed  Intervention: Promote Injury-Free Environment  Recent Flowsheet Documentation  Taken 7/19/2023 0430 by Marcie Gr RN  Safety Promotion/Fall Prevention: safety round/check completed  Taken 7/19/2023 0200 by Marcie Gr RN  Safety Promotion/Fall Prevention: safety round/check completed  Taken 7/19/2023 0030 by Marcie Gr RN  Safety Promotion/Fall Prevention: safety round/check completed  Taken 7/18/2023 2230 by Marcie Gr RN  Safety Promotion/Fall Prevention: safety round/check completed  Taken 7/18/2023 2022 by Marcie Gr RN  Safety Promotion/Fall Prevention:   activity supervised   assistive device/personal items within reach   clutter free environment maintained   fall prevention program maintained   nonskid shoes/slippers when out of bed   safety round/check completed     Problem: Urinary Retention  Goal: Effective Urinary Elimination  Outcome: Ongoing, Progressing

## 2023-07-19 NOTE — PROGRESS NOTES
[facility]    [phone]   LOS: 2 days     Name: Nino Kat  Age/Sex: 89 y.o. male  :  1930        PCP: Bakari Selby MD  Chief Complaint   Patient presents with    Extremity Weakness      Subjective   He had some urinary retention yesterday afternoon and was straight cathed and ultimately required indwelling Moser catheter to be placed overnight due to urinary retention issues.  Now having a lot of pain and discomfort with the Moser catheter and is experiencing some hematuria in the catheter bag.  Otherwise he denies any new neurologic symptoms he did not rest very well overnight and appetite is poor today.  General: No Fever or Chills, Cardiac: No Chest Pain or Palpitations, Resp: No Cough or SOA, GI: No Nausea, Vomiting, or Diarrhea, and Other: No bleeding    amLODIPine, 5 mg, Oral, Q24H  aspirin, 325 mg, Oral, Daily   Or  aspirin, 300 mg, Rectal, Daily  atorvastatin, 80 mg, Oral, Nightly  lactulose, 10 g, Oral, Daily  mirtazapine, 7.5 mg, Oral, Nightly  senna-docusate sodium, 2 tablet, Oral, BID  sodium chloride, 10 mL, Intravenous, Q12H  sodium chloride, 10 mL, Intravenous, Q12H  tamsulosin, 0.4 mg, Oral, Daily  timolol, 1 drop, Both Eyes, Q PM             Objective   Vital Signs  Temp:  [97.2 °F (36.2 °C)-98.6 °F (37 °C)] 98.6 °F (37 °C)  Heart Rate:  [56-71] 69  Resp:  [18-20] 18  BP: (147-166)/(79-88) 147/88  Body mass index is 25.69 kg/m².    Intake/Output Summary (Last 24 hours) at 2023 1223  Last data filed at 2023 0655  Gross per 24 hour   Intake 3313 ml   Output 3500 ml   Net -187 ml         Physical Exam  Vitals and nursing note reviewed.   Constitutional:       General: He is not in acute distress.     Appearance: Normal appearance. He is not ill-appearing.   Cardiovascular:      Rate and Rhythm: Normal rate and regular rhythm.   Pulmonary:      Effort: No respiratory distress.      Breath sounds: Normal breath sounds.   Abdominal:      General: Bowel  sounds are normal. There is no distension.      Palpations: Abdomen is soft.   Neurological:      General: No focal deficit present.      Mental Status: He is alert and oriented to person, place, and time. Mental status is at baseline.         Results Review:       I reviewed the patient's new clinical results.  Results from last 7 days   Lab Units 07/19/23  0725 07/18/23  0824 07/17/23  1028   WBC 10*3/mm3 7.94 9.05 8.01   HEMOGLOBIN g/dL 9.9* 11.2* 11.2*   PLATELETS 10*3/mm3 138* 168 176       Results from last 7 days   Lab Units 07/19/23  0725 07/18/23  0824 07/17/23  1028   SODIUM mmol/L 142 141 138   POTASSIUM mmol/L 4.0 4.0 4.2   CHLORIDE mmol/L 111* 107 101   CO2 mmol/L 21.0* 24.0 28.0   BUN mg/dL 30* 34* 31*   CREATININE mg/dL 1.58* 1.89* 1.83*   CALCIUM mg/dL 8.7 10.0* 10.8*   MAGNESIUM mg/dL 1.8  --   --    PHOSPHORUS mg/dL 2.6  --   --      Estimated Creatinine Clearance: 30.7 mL/min (A) (by C-G formula based on SCr of 1.58 mg/dL (H)).      Assessment & Plan   Active Hospital Problems    Diagnosis  POA    **Right sided weakness [R53.1]  Yes    Stage 3 chronic kidney disease [N18.30]  Unknown    Anemia [D64.9]  Unknown    HLD (hyperlipidemia) [E78.5]  Unknown    Acute renal failure [N17.9]  Unknown    Primary hypertension [I10]  Yes      Resolved Hospital Problems   No resolved problems to display.       PLAN  This is a 89-year-old gentleman with a history of hypertension chronic kidney disease and recent shingles infection who presents with weakness and was admitted over concern for possible stroke  -On my exam I do not really notice much of a difference between his right and left side as far as his strength in the bed.  We will see how he does with therapy to see if there is really a noticeable difference between the right and left side with ambulation.  -Appreciate neurology's recommendations.  Noted chronic strokes consistent with a stroke about a few months ago.  This probably sparked his decline.   Was probably related to his shingles infection.  -Flomax initiated for his urinary retention and Moser catheter now in place.  Urology consulted and they evaluated the patient shortly after I did.  Likely plan is to continue the Moser catheter at discharge with a voiding trial in the outpatient setting in a few weeks.  -Creatinine improving overnight.  Appreciate nephrology's assistance.  -His home antihypertensives have been held due to the acute renal failure.  We can start him on some as needed hydralazine for blood pressures greater than 160 but currently does not appear this will be needed.  -Mechanical DVT prophylaxis  -Full code    Disposition  Expected Discharge Date: 7/20/2023; Expected Discharge Time:    Plan [facility].    Carlos Bowers MD  [facility]  07/19/23  12:23 EDT

## 2023-07-19 NOTE — PROGRESS NOTES
[facility] Progress Note      Patient Name: Nino Kat  : 1930  MRN: [id number]  Primary Care Physician:  Bakari Selby MD  Date of admission: 2023    Subjective     Interval History:   Follow up Maryam on CKD III.  UOP excellent. BP up some. IVF 2.5 liters in . Required coude cath for retention .  Painful insertion.  Now with gross hematuria.  Pain improved.  Wants to get up to chair. Still feels very constipated.  No bm since .      Review of Systems:   As noted above    Objective     Vitals:   Temp:  [97.2 °F (36.2 °C)-98.6 °F (37 °C)] 98.6 °F (37 °C)  Heart Rate:  [56-71] 69  Resp:  [18-20] 18  BP: (147-171)/(79-94) 147/88    Intake/Output Summary (Last 24 hours) at 2023 0954  Last data filed at 2023 0655  Gross per 24 hour   Intake 3313 ml   Output 3500 ml   Net -187 ml       Physical Exam:    General Appearance: alert, oriented x 3, no acute distress . Appears stated age.  Skin: warm and dry  HEENT: oral mucosa normal, nonicteric sclera  Neck: supple, no JVD  Lungs: Clear to auscultation .  Heart: RRR, normal S1 and S2  Abdomen: soft, nontender, slightly distended. + bs  : catheter in place with dark urine, but urine in tubing clearing some .  Extremities: no edema.  Neuro: normal speech and mental status     Scheduled Meds:     aspirin, 325 mg, Oral, Daily   Or  aspirin, 300 mg, Rectal, Daily  atorvastatin, 80 mg, Oral, Nightly  mirtazapine, 7.5 mg, Oral, Nightly  polyethylene glycol, 17 g, Oral, Daily  senna-docusate sodium, 2 tablet, Oral, BID  sodium chloride, 10 mL, Intravenous, Q12H  sodium chloride, 10 mL, Intravenous, Q12H  tamsulosin, 0.4 mg, Oral, Daily  timolol, 1 drop, Both Eyes, Q PM      IV Meds:        Results Reviewed:   I have personally reviewed the results from the time of this admission to 2023 09:54 EDT     Results from last 7 days   Lab Units 23  0725 23  0824 23  1028   SODIUM mmol/L 142 141 138    POTASSIUM mmol/L 4.0 4.0 4.2   CHLORIDE mmol/L 111* 107 101   CO2 mmol/L 21.0* 24.0 28.0   BUN mg/dL 30* 34* 31*   CREATININE mg/dL 1.58* 1.89* 1.83*   CALCIUM mg/dL 8.7 10.0* 10.8*   BILIRUBIN mg/dL  --   --  0.4   ALK PHOS U/L  --   --  86   ALT (SGPT) U/L  --   --  14   AST (SGOT) U/L  --   --  19   GLUCOSE mg/dL 92 89 98       Estimated Creatinine Clearance: 30.7 mL/min (A) (by C-G formula based on SCr of 1.58 mg/dL (H)).    Results from last 7 days   Lab Units 07/19/23  0725   MAGNESIUM mg/dL 1.8   PHOSPHORUS mg/dL 2.6       Results from last 7 days   Lab Units 07/18/23  0824   URIC ACID mg/dL 7.0       Results from last 7 days   Lab Units 07/19/23  0725 07/18/23  0824 07/17/23  1028   WBC 10*3/mm3 7.94 9.05 8.01   HEMOGLOBIN g/dL 9.9* 11.2* 11.2*   PLATELETS 10*3/mm3 138* 168 176       Results from last 7 days   Lab Units 07/17/23  1028   INR  1.11*       Assessment / Plan     ASSESSMENT:  Maryam on likely CKD III with baseline crt 1.7 3/23.  Likely due to hypovolemia on Diuretic, ARB with inability to auto-regulate, and urinary retention . Requiring catheterization and now has hematuria . Flomax added. Creatinine better with IVF.  Right side weakness. Neuro eval noted. Likely had CVA in April.  Anemia  4.   HTN would not resume ARB for now.  Add norvasc .  5. Constipation, change to lactulose until BM .  PLAN:  Norvasc 5 mg daily  DC IVF  Encourage po fluids   to see  Change miralax to lactulose  6.   Up to chair .  7. DW RN.       Chel Orantes MD  07/19/23  09:54 EDT    [facility]  [phone]

## 2023-07-19 NOTE — PROGRESS NOTES
Continued Stay Note  [facility]     Patient Name: Nino Kat  MRN: [id number]  Today's Date: 7/19/2023    Admit Date: 7/17/2023    Plan: Return to [facility]   Discharge Plan       Row Name 07/19/23 8458       Plan    Plan Return to [facility]    Patient/Family in Agreement with Plan yes    Plan Comments Plan remains for pt to return to [facility], DON (Rasheeda, [phone]) confirms pt can return with shaw catheter and HH ([facility] has accepted). Pt ambulating 175' CGA with PT and will have on site therapies. Facility requests transfer packet (R: [phone], F: [phone]) at d/c. Pina Fox LCSW                   Discharge Codes    No documentation.                 Expected Discharge Date and Time       Expected Discharge Date Expected Discharge Time    Jul 20, 2023               Milena Fox LCSW

## 2023-07-19 NOTE — DISCHARGE PLACEMENT REQUEST
"Barry Jorge (89 y.o. Male)       Date of Birth   04/29/1930    Social Security Number       Address   [address] [place] [address]    Home Phone   [phone]    MRN   [id number]       [facility]    Marital Status                               Admission Date   7/17/23    Admission Type   Emergency    Admitting Provider   Edwin Cruz MD    Attending Provider   Carlos Bowers MD    Department, Room/Bed   [address], P597/1       Discharge Date       Discharge Disposition       Discharge Destination                                 Attending Provider: Carlos Bowers MD    Allergies: Diclofenac    Isolation: None   Infection: None   Code Status: CPR    Ht: 170.2 cm (67\")   Wt: 74.4 kg (164 lb)    Admission Cmt: None   Principal Problem: Right sided weakness [R53.1]                   Active Insurance as of 7/17/2023       Primary Coverage       Payor Plan Insurance Group Employer/Plan Group    MEDICARE MEDICARE A & B        Payor Plan Address Payor Plan Phone Number Payor Plan Fax Number Effective Dates    [address] [phone]  4/1/1995 - None Entered    [facility] [id number]         Subscriber Name Subscriber Birth Date Member ID       BARRY JORGE 4/29/1930 [id number]               Secondary Coverage       Payor Plan Insurance Group Employer/Plan Group    [facility]        Payor Plan Address Payor Plan Phone Number Payor Plan Fax Number Effective Dates    [facility] [phone]  9/1/2021 - None Entered    [address]       [facility] [id number]         Subscriber Name Subscriber Birth Date Member ID       BARRY JORGE 4/29/1930 [id number]                     Emergency Contacts        (Rel.) Home Phone Work Phone Mobile Phone    ERIC JORGE (Son) [phone] -- --              "

## 2023-07-20 NOTE — PLAN OF CARE
Problem: Adult Inpatient Plan of Care  Goal: Absence of Hospital-Acquired Illness or Injury  Intervention: Prevent Skin Injury  Recent Flowsheet Documentation  Taken 7/20/2023 1600 by Pilar Prado RN  Body Position: position changed independently  Taken 7/20/2023 1400 by Pilar Prado RN  Body Position: position changed independently  Taken 7/20/2023 1200 by Pilar Prado RN  Body Position: position changed independently  Taken 7/20/2023 1000 by Pilar Prado RN  Body Position: position changed independently  Taken 7/20/2023 0810 by Pilar Prado RN  Body Position: position changed independently     Problem: Fall Injury Risk  Goal: Absence of Fall and Fall-Related Injury  Outcome: Ongoing, Progressing  Intervention: Identify and Manage Contributors  Recent Flowsheet Documentation  Taken 7/20/2023 1000 by Pilar Prado RN  Medication Review/Management: medications reviewed  Intervention: Promote Injury-Free Environment  Recent Flowsheet Documentation  Taken 7/20/2023 1600 by Pilar Prado RN  Safety Promotion/Fall Prevention:   activity supervised   safety round/check completed   nonskid shoes/slippers when out of bed   fall prevention program maintained  Taken 7/20/2023 1400 by Pilar Prado RN  Safety Promotion/Fall Prevention:   activity supervised   safety round/check completed   nonskid shoes/slippers when out of bed   fall prevention program maintained  Taken 7/20/2023 1200 by Pilar Prado RN  Safety Promotion/Fall Prevention:   activity supervised   assistive device/personal items within reach   nonskid shoes/slippers when out of bed   safety round/check completed  Taken 7/20/2023 1000 by Pilar Prado RN  Safety Promotion/Fall Prevention:   safety round/check completed   nonskid shoes/slippers when out of bed   fall prevention program maintained   activity supervised  Taken 7/20/2023 0810 by Pilar Prado RN  Safety Promotion/Fall Prevention:   activity  supervised   assistive device/personal items within reach   fall prevention program maintained   nonskid shoes/slippers when out of bed   safety round/check completed     Problem: Urinary Retention  Goal: Effective Urinary Elimination  Outcome: Ongoing, Progressing     Problem: Constipation  Goal: Effective Bowel Elimination  Outcome: Ongoing, Progressing   Goal Outcome Evaluation:      Patient received soap suds enema and had positive results.  4 BM's.  Moser catheter in place. Urine was pink this morning. Blood tinge this evening.  Patient is sitting in chair at bedside.

## 2023-07-20 NOTE — PLAN OF CARE
Goal Outcome Evaluation:  Plan of Care Reviewed With: patient        Progress: improving  Outcome Evaluation: Pt tolerated treatment well this date. Increased gait distance to 240ft, then 100ft w/ Rw and seated rest break between. Required CGA to ambulate, then CGA-min A to stand. Encouraged pt to ambulate w/ nsg during the day. Hopeful to return to [facility] tomorrow.      Anticipated Discharge Disposition (PT): assisted living

## 2023-07-20 NOTE — THERAPY TREATMENT NOTE
Patient Name: Nino Kat  : 1930    MRN: [id number]                              Today's Date: 2023       Admit Date: 2023    Visit Dx:     ICD-10-CM ICD-9-CM   1. Right sided weakness  R53.1 728.87   2. Acute renal failure, unspecified acute renal failure type  N17.9 584.9     Patient Active Problem List   Diagnosis    Abnormal EKG    Primary hypertension    Right sided weakness    Acute renal failure    Stage 3 chronic kidney disease    Anemia    HLD (hyperlipidemia)     Past Medical History:   Diagnosis Date    Hypertension     Rotator cuff disorder     tear and repaired both arms     Past Surgical History:   Procedure Laterality Date    ROTATOR CUFF REPAIR Left     TOTAL HIP ARTHROPLASTY Left       General Information       Row Name 23 1202          OT Time and Intention    Document Type therapy note (daily note)  -JIGNESH     Mode of Treatment individual therapy;occupational therapy  -JIGNESH       Row Name 23 1202          General Information    Patient Profile Reviewed yes  -JIGNESH     Existing Precautions/Restrictions fall  -       Row Name 23 1202          Cognition    Orientation Status (Cognition) oriented x 3  -KA       Row Name 23 1202          Safety Issues, Functional Mobility    Impairments Affecting Function (Mobility) balance;endurance/activity tolerance;strength  -JIGNESH     Comment, Safety Issues/Impairments (Mobility) nonskid socks and gait belt donned  -JIGNESH               User Key  (r) = Recorded By, (t) = Taken By, (c) = Cosigned By      Initials Name Provider Type    Jinny Ramires OT Occupational Therapist                     Mobility/ADL's       Row Name 23 1202          Bed Mobility    Bed Mobility supine-sit;sit-supine  -JIGNEHS     Supine-Sit [place] (Bed Mobility) standby assist  -JIGNESH     Sit-Supine [place] (Bed Mobility) standby assist  -JIGNESH     Assistive Device (Bed Mobility) head of bed elevated  -JIGNESH     Comment, (Bed Mobility) increased  time to complete  -       Row Name 07/20/23 1202          Sit-Stand Transfer    Sit-Stand [place] (Transfers) contact guard;verbal cues  -     Assistive Device (Sit-Stand Transfers) walker front-wheeled  -JIGNESH       Row Name 07/20/23 1202          Functional Mobility    Functional Mobility- Ind. Level contact guard assist  -     Functional Mobility- Device walker, front-wheeled  -JIGNESH     Functional Mobility- Comment Performed fxl mobility within room to bathroom and stood at sink for grooming. Pt then ambulated within room out into hallway and back to bed. Multiple cues for proper posititioning with rwx when returning back to bed. Pt also [place]  -       Row Name 07/20/23 1202          Activities of Daily Living    BADL Assessment/Intervention grooming  -       Row Name 07/20/23 1202          Grooming Assessment/Training    [place] Level (Grooming) grooming skills;oral care regimen;wash face, hands  -     Position (Grooming) sink side;supported standing  rwx sinkside  -     Comment, (Grooming) required min A with grooming. pt unabe to open toothpaste. reports he feels his R hand is weaker. required no seated rest breaks at the sink  -               User Key  (r) = Recorded By, (t) = Taken By, (c) = Cosigned By      Initials Name Provider Type    Jinny Ramires OT Occupational Therapist                   Obj/Interventions       Row Name 07/20/23 1206          Motor Skills    Motor Skills coordination  -     Coordination fine motor deficit;right;upper extremity;minimal impairment  with brushing teeth at the sink today  -       Row Name 07/20/23 1206          Balance    Static Sitting Balance standby assist  -     Dynamic Sitting Balance standby assist  -     Position, Sitting Balance sitting edge of bed  -     Static Standing Balance standby assist  -     Dynamic Standing Balance contact guard  -     Position/Device Used, Standing Balance walker, front-wheeled  -     Balance  Interventions sitting;standing;occupation based/functional task  -               User Key  (r) = Recorded By, (t) = Taken By, (c) = Cosigned By      Initials Name Provider Type    Jinny Ramires, GALO Occupational Therapist                   Goals/Plan    No documentation.                  Clinical Impression       Row Name 07/20/23 1207          Pain Assessment    Pretreatment Pain Rating 0/10 - no pain  -     Posttreatment Pain Rating 0/10 - no pain  -       Row Name 07/20/23 1207          Plan of Care Review    Plan of Care Reviewed With patient  -JIGNESH     Progress improving  -     Outcome Evaluation Pt seen for OT session this AM. Pt supine in bed and agreeable for therapy and eager to get up. Stood with CGA and use of rwx and ambulated into bathroom for standing grooming tasks at the sink. Required assistance to open containers stating his R hand is weaker. Assistance to apply toothpaste to toothbrush due to visual deficits (pt reports he recently had shingles on R side and in eye). Required no seated rest breaks with activity. pt then performed functional mobility within hallway and back to bed with CGA and use of rwx. He required multiple cues for proper positioning of rwx when returning to bed. Pt is also [place]. Pt to continue to benefit from skilled OT. Pt is hopeful to return to [facility] soon with HHOT.  -       Row Name 07/20/23 1207          Therapy Plan Review/Discharge Plan (OT)    Anticipated Discharge Disposition (OT) home with assist;home with home health  -       Row Name 07/20/23 1207          Vital Signs    Pre Patient Position Supine  -     Intra Patient Position Standing  -     Post Patient Position Supine  -       Row Name 07/20/23 1207          Positioning and Restraints    Pre-Treatment Position in bed  -KA     Post Treatment Position bed  -KA     In Bed call light within reach;encouraged to call for assist;exit alarm on;supine  -               User Key  (r) =  Recorded By, (t) = Taken By, (c) = Cosigned By      Initials Name Provider Type    Jinny Ramires OT Occupational Therapist                   Outcome Measures       Row Name 07/20/23 1212          How much help from another is currently needed...    Putting on and taking off regular lower body clothing? 3  -KA     Bathing (including washing, rinsing, and drying) 3  -KA     Toileting (which includes using toilet bed pan or urinal) 3  -KA     Putting on and taking off regular upper body clothing 3  -KA     Taking care of personal grooming (such as brushing teeth) 3  -KA     Eating meals 4  -KA     AM-PAC 6 Clicks Score (OT) 19  -KA       Row Name 07/20/23 0810          How much help from another person do you currently need...    Turning from your back to your side while in flat bed without using bedrails? 4  -MM     Moving from lying on back to sitting on the side of a flat bed without bedrails? 3  -MM     Moving to and from a bed to a chair (including a wheelchair)? 3  -MM     Standing up from a chair using your arms (e.g., wheelchair, bedside chair)? 2  -MM     Climbing 3-5 steps with a railing? 2  -MM     To walk in hospital room? 2  -MM     AM-PAC 6 Clicks Score (PT) 16  -MM     Highest level of mobility 5 --> Static standing  -MM       Row Name 07/20/23 1212          Functional Assessment    Outcome Measure Options AM-PAC 6 Clicks Daily Activity (OT)  -KA               User Key  (r) = Recorded By, (t) = Taken By, (c) = Cosigned By      Initials Name Provider Type    Jinny Ramires OT Occupational Therapist    Pilar Mehta, RN Registered Nurse                    Occupational Therapy Education       Title: PT OT SLP Therapies (In Progress)       Topic: Occupational Therapy (In Progress)       Point: ADL training (Done)       Description:   Instruct learner(s) on proper safety adaptation and remediation techniques during self care or transfers.   Instruct in proper use of assistive devices.                   Learning Progress Summary             Patient Acceptance, E, VU by SETH at 7/18/2023 1525    Comment: role of OT, plan of care, safety                         Point: Home exercise program (Not Started)       Description:   Instruct learner(s) on appropriate technique for monitoring, assisting and/or progressing therapeutic exercises/activities.                  Learner Progress:  Not documented in this visit.              Point: Precautions (Done)       Description:   Instruct learner(s) on prescribed precautions during self-care and functional transfers.                  Learning Progress Summary             Patient Acceptance, E, VU by SETH at 7/18/2023 1525    Comment: role of OT, plan of care, safety                         Point: Body mechanics (Done)       Description:   Instruct learner(s) on proper positioning and spine alignment during self-care, functional mobility activities and/or exercises.                  Learning Progress Summary             Patient Acceptance, E, VU by SETH at 7/18/2023 1525    Comment: role of OT, plan of care, safety                                         User Key       Initials Effective Dates Name Provider Type Discipline     06/10/21 -  Myra Abarca OT Occupational Therapist OT                  OT Recommendation and Plan     Plan of Care Review  Plan of Care Reviewed With: patient  Progress: improving  Outcome Evaluation: Pt seen for OT session this AM. Pt supine in bed and agreeable for therapy and eager to get up. Stood with CGA and use of rwx and ambulated into bathroom for standing grooming tasks at the sink. Required assistance to open containers stating his R hand is weaker. Assistance to apply toothpaste to toothbrush due to visual deficits (pt reports he recently had shingles on R side and in eye). Required no seated rest breaks with activity. pt then performed functional mobility within hallway and back to bed with CGA and use of rwx. He required multiple  cues for proper positioning of rwx when returning to bed. Pt is also [place]. Pt to continue to benefit from skilled OT. Pt is hopeful to return to [facility] soon with HHOT.     Time Calculation:         Time Calculation- OT       Row Name 07/20/23 1212             Time Calculation- OT    OT Start Time 1125  -KA      OT Stop Time 1150  -KA      OT Time Calculation (min) 25 min  -KA      OT Received On 07/20/23  -KA      OT - Next Appointment 07/21/23  -KA         Timed Charges    46660 - OT Therapeutic Activity Minutes 10  -KA      63553 - OT Self Care/Mgmt Minutes 15  -KA         Total Minutes    Timed Charges Total Minutes 25  -KA       Total Minutes 25  -KA                User Key  (r) = Recorded By, (t) = Taken By, (c) = Cosigned By      Initials Name Provider Type    iJnny Ramires OT Occupational Therapist                  Therapy Charges for Today       Code Description Service Date Service Provider Modifiers Qty    93988670992 HC OT THERAPEUTIC ACT EA 15 MIN 7/20/2023 Jinny Slaughter OT GO 1    70256904994 HC OT SELF CARE/MGMT/TRAIN EA 15 MIN 7/20/2023 Jinny Slaughter OT GO 1                 Jinny Slaughter OT  7/20/2023

## 2023-07-20 NOTE — THERAPY TREATMENT NOTE
Patient Name: Nino Kat  : 1930    MRN: [id number]                              Today's Date: 2023       Admit Date: 2023    Visit Dx:     ICD-10-CM ICD-9-CM   1. Right sided weakness  R53.1 728.87   2. Acute renal failure, unspecified acute renal failure type  N17.9 584.9     Patient Active Problem List   Diagnosis    Abnormal EKG    Primary hypertension    Right sided weakness    Acute renal failure    Stage 3 chronic kidney disease    Anemia    HLD (hyperlipidemia)     Past Medical History:   Diagnosis Date    Hypertension     Rotator cuff disorder     tear and repaired both arms     Past Surgical History:   Procedure Laterality Date    ROTATOR CUFF REPAIR Left     TOTAL HIP ARTHROPLASTY Left       General Information       Row Name 23 1608          Physical Therapy Time and Intention    Document Type therapy note (daily note)  -     Mode of Treatment physical therapy  -       Row Name 23 1608          General Information    Existing Precautions/Restrictions fall  -       Row Name 23 1608          Cognition    Orientation Status (Cognition) oriented x 4  -               User Key  (r) = Recorded By, (t) = Taken By, (c) = Cosigned By      Initials Name Provider Type     Mona Al PTA Physical Therapist Assistant                   Mobility       Row Name 23 1609          Bed Mobility    Bed Mobility supine-sit;sit-supine  -     Supine-Sit [place] (Bed Mobility) supervision  -     Sit-Supine [place] (Bed Mobility) supervision  -     Assistive Device (Bed Mobility) bed rails;head of bed elevated  -       Row Name 23 1609          Sit-Stand Transfer    Sit-Stand [place] (Transfers) contact guard;minimum assist (75% patient effort)  -     Assistive Device (Sit-Stand Transfers) walker, front-wheeled  -       Row Name 23 1609          Gait/Stairs (Locomotion)    [place] Level (Gait) contact guard  -      Assistive Device (Gait) walker, front-wheeled  -     Distance in Feet (Gait) 240ft, then 100ft  -SM     Deviations/Abnormal Patterns (Gait) zena[place] decreased;stride length decreased  -SM     Bilateral Gait Deviations forward flexed posture  -SM               User Key  (r) = Recorded By, (t) = Taken By, (c) = Cosigned By      Initials Name Provider Type    Mona Hyatt PTA Physical Therapist Assistant                   Obj/Interventions       Row Name 07/20/23 1610          Motor Skills    Therapeutic Exercise --  seated AP and LAQ x10 reps  -               User Key  (r) = Recorded By, (t) = Taken By, (c) = Cosigned By      Initials Name Provider Type    Mona Hyatt PTA Physical Therapist Assistant                   Goals/Plan    No documentation.                  Clinical Impression       Row Name 07/20/23 1610          Pain    Pretreatment Pain Rating 0/10 - no pain  -SM     Posttreatment Pain Rating 0/10 - no pain  -SM       Row Name 07/20/23 1610          Positioning and Restraints    Pre-Treatment Position in bed  -SM     Post Treatment Position bed  -SM     In Bed supine;call light within reach;encouraged to call for assist;exit alarm on  -SM               User Key  (r) = Recorded By, (t) = Taken By, (c) = Cosigned By      Initials Name Provider Type    Mona Hyatt PTA Physical Therapist Assistant                   Outcome Measures       Row Name 07/20/23 1610 07/20/23 0810       How much help from another person do you currently need...    Turning from your back to your side while in flat bed without using bedrails? 4  -SM 4  -MM    Moving from lying on back to sitting on the side of a flat bed without bedrails? 3  -SM 3  -MM    Moving to and from a bed to a chair (including a wheelchair)? 3  -SM 3  -MM    Standing up from a chair using your arms (e.g., wheelchair, bedside chair)? 3  -SM 2  -MM    Climbing 3-5 steps with a railing? 3  -SM 2  -MM    To walk in hospital  room? 3  -SM 2  -MM    AM-PAC 6 Clicks Score (PT) 19  - 16  -MM    Highest level of mobility 6 --> Walked 10 steps or more  - 5 --> Static standing  -MM      Row Name 07/20/23 1610 07/20/23 1212       Functional Assessment    Outcome Measure Options AM-PAC 6 Clicks Basic Mobility (PT)  - AM-PAC 6 Clicks Daily Activity (OT)  -JIGNESH              User Key  (r) = Recorded By, (t) = Taken By, (c) = Cosigned By      Initials Name Provider Type     Mona Al PTA Physical Therapist Assistant    Jinny Ramires OT Occupational Therapist    Pilar Mehta, RN Registered Nurse                                 Physical Therapy Education       Title: PT OT SLP Therapies (In Progress)       Topic: Physical Therapy (Done)       Point: Mobility training (Done)       Learning Progress Summary             Patient Acceptance, E,TB,D, VU,NR by  at 7/20/2023 1611    Acceptance, E,TB, VU,DU by  at 7/18/2023 1438                         Point: Home exercise program (Done)       Learning Progress Summary             Patient Acceptance, E,TB,D, VU,NR by  at 7/20/2023 1611                         Point: Body mechanics (Done)       Learning Progress Summary             Patient Acceptance, E,TB,D, VU,NR by  at 7/20/2023 1611    Acceptance, E,TB, VU,DU by  at 7/18/2023 1438                         Point: Precautions (Done)       Learning Progress Summary             Patient Acceptance, E,TB,D, VU,NR by  at 7/20/2023 1611    Acceptance, E,TB, VU,DU by  at 7/18/2023 1438                                         User Key       Initials Effective Dates Name Provider Type Discipline     03/07/18 -  Mona Al PTA Physical Therapist Assistant PT     09/22/22 -  Milagro Simon PT Physical Therapist PT                  PT Recommendation and Plan     Plan of Care Reviewed With: patient  Progress: improving  Outcome Evaluation: Pt tolerated treatment well this date. Increased gait distance to 240ft,  then 100ft w/ Rw and seated rest break between. Required CGA to ambulate, then CGA-min A to stand. Encouraged pt to ambulate w/ nsg during the day. Hopeful to return to [facility] tomorrow.     Time Calculation:         PT Charges       Row Name 07/20/23 1613             Time Calculation    Start Time 1532  -      Stop Time 1605  -      Time Calculation (min) 33 min  -      PT Received On 07/20/23  -      PT - Next Appointment 07/21/23  -                User Key  (r) = Recorded By, (t) = Taken By, (c) = Cosigned By      Initials Name Provider Type     Mona Al PTA Physical Therapist Assistant                  Therapy Charges for Today       Code Description Service Date Service Provider Modifiers Qty    68360916775 HC PT THER PROC EA 15 MIN 7/20/2023 Mona Al PTA GP 1    17635677371 HC GAIT TRAINING EA 15 MIN 7/20/2023 Mona Al PTA GP 1            PT G-Codes  Outcome Measure Options: AM-PAC 6 Clicks Basic Mobility (PT)  AM-PAC 6 Clicks Score (PT): 19  AM-PAC 6 Clicks Score (OT): 19  Modified Hank Scale: 4 - Moderately severe disability.  Unable to walk without assistance, and unable to attend to own bodily needs without assistance.  PT Discharge Summary  Anticipated Discharge Disposition (PT): assisted living    Mona Al PTA  7/20/2023

## 2023-07-20 NOTE — PLAN OF CARE
Problem: Adult Inpatient Plan of Care  Goal: Plan of Care Review  Outcome: Ongoing, Progressing  Goal: Patient-Specific Goal (Individualized)  Outcome: Ongoing, Progressing  Goal: Absence of Hospital-Acquired Illness or Injury  Outcome: Ongoing, Progressing  Intervention: Identify and Manage Fall Risk  Recent Flowsheet Documentation  Taken 7/20/2023 0421 by Marcie Gr RN  Safety Promotion/Fall Prevention: safety round/check completed  Taken 7/19/2023 2030 by Marcie Gr RN  Safety Promotion/Fall Prevention:   activity supervised   assistive device/personal items within reach   clutter free environment maintained   fall prevention program maintained   nonskid shoes/slippers when out of bed   safety round/check completed  Intervention: Prevent Skin Injury  Recent Flowsheet Documentation  Taken 7/20/2023 0421 by Marcie Gr RN  Body Position: position changed independently  Taken 7/19/2023 2030 by Marcie Gr RN  Body Position: position changed independently  Intervention: Prevent and Manage VTE (Venous Thromboembolism) Risk  Recent Flowsheet Documentation  Taken 7/19/2023 2030 by Marcie Gr RN  Activity Management: activity encouraged  VTE Prevention/Management: patient refused intervention  Goal: Optimal Comfort and Wellbeing  Outcome: Ongoing, Progressing  Goal: Readiness for Transition of Care  Outcome: Ongoing, Progressing     Problem: Fall Injury Risk  Goal: Absence of Fall and Fall-Related Injury  Outcome: Ongoing, Progressing  Intervention: Identify and Manage Contributors  Recent Flowsheet Documentation  Taken 7/19/2023 2030 by Marcie Gr RN  Medication Review/Management: medications reviewed  Intervention: Promote Injury-Free Environment  Recent Flowsheet Documentation  Taken 7/20/2023 0421 by Marcie Gr RN  Safety Promotion/Fall Prevention: safety round/check completed  Taken 7/19/2023 2030 by Marcie Gr RN  Safety Promotion/Fall Prevention:   activity  supervised   assistive device/personal items within reach   clutter free environment maintained   fall prevention program maintained   nonskid shoes/slippers when out of bed   safety round/check completed     Problem: Urinary Retention  Goal: Effective Urinary Elimination  Outcome: Ongoing, Progressing

## 2023-07-20 NOTE — PLAN OF CARE
Goal Outcome Evaluation:  Plan of Care Reviewed With: patient        Progress: improving  Outcome Evaluation: Pt seen for OT session this AM. Pt supine in bed and agreeable for therapy and eager to get up. Stood with CGA and use of rwx and ambulated into bathroom for standing grooming tasks at the sink. Required assistance to open containers stating his R hand is weaker. Assistance to apply toothpaste to toothbrush due to visual deficits (pt reports he recently had shingles on R side and in eye). Required no seated rest breaks with activity. pt then performed functional mobility within hallway and back to bed with CGA and use of rwx. He required multiple cues for proper positioning of rwx when returning to bed. Pt is also [place]. Pt to continue to benefit from skilled OT. Pt is hopeful to return to [facility] soon with HHOT.      Anticipated Discharge Disposition (OT): home with assist, home with home health

## 2023-07-20 NOTE — PROGRESS NOTES
[facility]    [phone]   LOS: 3 days     Name: Nino Kat  Age/Sex: 89 y.o. male  :  1930        PCP: Bakari Selby MD  Chief Complaint   Patient presents with    Extremity Weakness      Subjective   Tolerating Moser catheter today.  No new issues or complaints.  He has not had a bowel movement in quite some time and remains somewhat constipated.  General: No Fever or Chills, Cardiac: No Chest Pain or Palpitations, Resp: No Cough or SOA, GI: No Nausea, Vomiting, or Diarrhea, and Other: No bleeding    amLODIPine, 5 mg, Oral, Q24H  aspirin, 325 mg, Oral, Daily   Or  aspirin, 300 mg, Rectal, Daily  atorvastatin, 80 mg, Oral, Nightly  lactulose, 10 g, Oral, Daily  mirtazapine, 7.5 mg, Oral, Nightly  senna-docusate sodium, 2 tablet, Oral, BID  sodium chloride, 10 mL, Intravenous, Q12H  sodium chloride, 10 mL, Intravenous, Q12H  tamsulosin, 0.4 mg, Oral, BID  timolol, 1 drop, Both Eyes, Q PM             Objective   Vital Signs  Temp:  [97.3 °F (36.3 °C)-98.3 °F (36.8 °C)] 97.5 °F (36.4 °C)  Heart Rate:  [71-78] 78  Resp:  [16-18] 16  BP: (107-164)/(57-89) 158/89  Body mass index is 25.69 kg/m².  No intake or output data in the 24 hours ending 23 0805      Physical Exam  Vitals and nursing note reviewed.   Constitutional:       General: He is not in acute distress.     Appearance: Normal appearance. He is not ill-appearing.   Cardiovascular:      Rate and Rhythm: Normal rate and regular rhythm.   Pulmonary:      Effort: No respiratory distress.      Breath sounds: Normal breath sounds.   Abdominal:      General: Bowel sounds are normal. There is no distension.      Palpations: Abdomen is soft.   Neurological:      General: No focal deficit present.      Mental Status: He is alert and oriented to person, place, and time. Mental status is at baseline.         Results Review:       I reviewed the patient's new clinical results.  Results from last 7 days   Lab Units  07/20/23  0652 07/19/23  0725 07/18/23  0824 07/17/23  1028   WBC 10*3/mm3 7.64 7.94 9.05 8.01   HEMOGLOBIN g/dL 10.5* 9.9* 11.2* 11.2*   PLATELETS 10*3/mm3 141 138* 168 176       Results from last 7 days   Lab Units 07/20/23  0652 07/19/23  0725 07/18/23  0824 07/17/23  1028   SODIUM mmol/L 143 142 141 138   POTASSIUM mmol/L 4.2 4.0 4.0 4.2   CHLORIDE mmol/L 113* 111* 107 101   CO2 mmol/L 21.4* 21.0* 24.0 28.0   BUN mg/dL 23 30* 34* 31*   CREATININE mg/dL 1.46* 1.58* 1.89* 1.83*   CALCIUM mg/dL 8.9 8.7 10.0* 10.8*   MAGNESIUM mg/dL 1.8 1.8  --   --    PHOSPHORUS mg/dL 2.7 2.6  --   --      Estimated Creatinine Clearance: 33.3 mL/min (A) (by C-G formula based on SCr of 1.46 mg/dL (H)).      Assessment & Plan   Active Hospital Problems    Diagnosis  POA    **Right sided weakness [R53.1]  Yes    Stage 3 chronic kidney disease [N18.30]  Unknown    Anemia [D64.9]  Unknown    HLD (hyperlipidemia) [E78.5]  Unknown    Acute renal failure [N17.9]  Unknown    Primary hypertension [I10]  Yes      Resolved Hospital Problems   No resolved problems to display.       PLAN  This is a 89-year-old gentleman with a history of hypertension chronic kidney disease and recent shingles infection who presents with weakness and was admitted over concern for possible stroke  -On my exam I do not really notice much of a difference between his right and left side as far as his strength in the bed.  We will see how he does with therapy to see if there is really a noticeable difference between the right and left side with ambulation.  -Appreciate neurology's recommendations.  Noted chronic strokes consistent with a stroke about a few months ago.  This probably sparked his decline.  Was probably related to his shingles infection.  -Flomax initiated for his urinary retention and Moser catheter now in place.  Urology consulted and they evaluated the patient shortly after I did.  Likely plan is to continue the Moser catheter at discharge with a  voiding trial in the outpatient setting in a few weeks.  -Enema ordered for bowel movement  -Creatinine improving overnight.  Appreciate nephrology's assistance.  -His home antihypertensives have been held due to the acute renal failure.  We can start him on some as needed hydralazine for blood pressures greater than 160 but currently does not appear this will be needed.  -Mechanical DVT prophylaxis  -Full code    Disposition  Expected Discharge Date: 7/20/2023; Expected Discharge Time:    Plan for discharge once he has a bowel movement.  Hopefully we can do this this evening otherwise we will plan on discharge tomorrow.    Carlos Bowers MD  [facility]  07/20/23  08:05 EDT

## 2023-07-20 NOTE — PROGRESS NOTES
[facility] Progress Note      Patient Name: Nino Kat  : 1930  MRN: [id number]  Primary Care Physician:  Bakari Selby MD  Date of admission: 2023    Subjective     Interval History:   Follow up Maryam on CKD III.  UOP not recorded despite shaw. Slept well.  Ate breakfast .    Review of Systems:   As noted above    Objective     Vitals:   Temp:  [97.3 °F (36.3 °C)-98.6 °F (37 °C)] 98.6 °F (37 °C)  Heart Rate:  [71-96] 96  Resp:  [16-18] 18  BP: (107-164)/(57-89) 151/80  No intake or output data in the 24 hours ending 23 0958      Physical Exam:    General Appearance: alert, oriented x 3, no acute distress . Appears stated age.  Skin: warm and dry  HEENT: oral mucosa dry, lips chapped .nonicteric sclera  Neck: supple, no JVD  Lungs: Clear to auscultation .  Heart: RRR, normal S1 and S2  Abdomen: soft, nontender, slightly distended. + bs  : catheter in place .  Extremities: no edema.  Neuro: normal speech and mental status     Scheduled Meds:     amLODIPine, 5 mg, Oral, Q24H  aspirin, 325 mg, Oral, Daily   Or  aspirin, 300 mg, Rectal, Daily  atorvastatin, 80 mg, Oral, Nightly  lactulose, 10 g, Oral, Daily  mirtazapine, 7.5 mg, Oral, Nightly  senna-docusate sodium, 2 tablet, Oral, BID  sodium chloride, 10 mL, Intravenous, Q12H  sodium chloride, 10 mL, Intravenous, Q12H  tamsulosin, 0.4 mg, Oral, BID  timolol, 1 drop, Both Eyes, Q PM      IV Meds:        Results Reviewed:   I have personally reviewed the results from the time of this admission to 2023 09:58 EDT     Results from last 7 days   Lab Units 23  0652 23  0725 23  0824 23  1028   SODIUM mmol/L 143 142 141 138   POTASSIUM mmol/L 4.2 4.0 4.0 4.2   CHLORIDE mmol/L 113* 111* 107 101   CO2 mmol/L 21.4* 21.0* 24.0 28.0   BUN mg/dL 23 30* 34* 31*   CREATININE mg/dL 1.46* 1.58* 1.89* 1.83*   CALCIUM mg/dL 8.9 8.7 10.0* 10.8*   BILIRUBIN mg/dL  --   --   --  0.4   ALK PHOS U/L   --   --   --  86   ALT (SGPT) U/L  --   --   --  14   AST (SGOT) U/L  --   --   --  19   GLUCOSE mg/dL 95 92 89 98         Estimated Creatinine Clearance: 33.3 mL/min (A) (by C-G formula based on SCr of 1.46 mg/dL (H)).    Results from last 7 days   Lab Units 07/20/23  0652 07/19/23  0725   MAGNESIUM mg/dL 1.8 1.8   PHOSPHORUS mg/dL 2.7 2.6         Results from last 7 days   Lab Units 07/18/23  0824   URIC ACID mg/dL 7.0         Results from last 7 days   Lab Units 07/20/23  0652 07/19/23  0725 07/18/23  0824 07/17/23  1028   WBC 10*3/mm3 7.64 7.94 9.05 8.01   HEMOGLOBIN g/dL 10.5* 9.9* 11.2* 11.2*   PLATELETS 10*3/mm3 141 138* 168 176         Results from last 7 days   Lab Units 07/17/23  1028   INR  1.11*         Assessment / Plan     ASSESSMENT:  Maryam on likely CKD III with baseline crt 1.7 3/23.  Likely due to hypovolemia on Diuretic, ARB with inability to auto-regulate, and urinary retention . Requiring catheterization and now has hematuria . Flomax added. Creatinine better with IVF.  Right side weakness. Neuro eval noted. Likely had CVA in April.  Anemia  4.   HTN . Added norvasc yesterday. Will not change today . Would not resume ARB.   5. Constipation, on lactulose.  Enema this am. If no BM .  RN informed .  PLAN:  Enema this am if no response to laxative.  2. Discussed with RN record urine output.  3. IF his bowels move, I would have no objection to dc with colin .    Chel Orantes MD  07/20/23  09:58 EDT    [facility]  [phone]

## 2023-07-21 NOTE — DISCHARGE SUMMARY
Patient Name: Nino Kat  : 1930  MRN: [id number]    Date of Admission: 2023  Date of Discharge:  2023  Primary Care Physician: Bakari Selby MD      Chief Complaint:   Extremity Weakness      Discharge Diagnoses     Active Hospital Problems    Diagnosis  POA    **Right sided weakness [R53.1]  Yes    Stage 3 chronic kidney disease [N18.30]  Unknown    Anemia [D64.9]  Unknown    HLD (hyperlipidemia) [E78.5]  Unknown    Acute renal failure [N17.9]  Unknown    Primary hypertension [I10]  Yes      Resolved Hospital Problems   No resolved problems to display.        Hospital Course     Mr. Kat is a 89 y.o. male with a history of hypertension hyperlipidemia chronic kidney disease who presented to [facility] initially complaining of right-sided weakness.  Please see the admitting history and physical for further details.  He was found to have right-sided weakness and was admitted to the hospital for further evaluation and treatment.  He was admitted to the hospital and initial imaging showed no acute strokes.  His history was somewhat concerning however for stroke months back.  It sounds like he had shingles that was rather significant and severe and may have had a stroke at that time.  Unfortunately he is done decently well and was never seen or evaluated by neurology in the outpatient or inpatient setting following his onset of symptoms.  On evaluation here neurology has placed him on full dose aspirin and high-dose statin therapy.  They will plan to follow him up in the outpatient setting in 3 months.  Otherwise they recommend ongoing physical and Occupational Therapy.  He also had acute on chronic renal failure present on admission probably secondary to hypotension from his blood pressure medications and diuretic.  His creatinine has returned to baseline with IV fluids and electrolytes remained stable.  He was started on Norvasc and plan is to hold his diuretic  therapy and angiotensin receptor blocker at discharge.  He should follow-up with nephrology in a few weeks in the outpatient setting.  He also had some urinary retention that could also be contributing to his renal dysfunction.  Moser catheter was placed and has been discontinued at the time of discharge.  As long as he voids he can follow-up with urology in a month however if Moser catheter needs to be replaced he will be continued on tamsulosin and follow-up with urology in the outpatient setting in a week or 2 for an additional voiding trial at that time.  This plan was discussed with the patient at the bedside all questions addressed and answered he stable to discharge to his facility with home health physical therapy today      Addendum: Unfortunately he failed his voiding trial Moser catheter to be replaced and discharged to outpatient follow-up with urology  Day of Discharge     Subjective:  He is eager to get out of the hospital.  Moser catheter removed today he is working on trying to void.  Denies new neurologic symptoms.  He is frustrated with being on telemetry and wants off of the telemetry pack and oxygen sensors.    Physical Exam:  Temp:  [98 °F (36.7 °C)-98.7 °F (37.1 °C)] 98.2 °F (36.8 °C)  Heart Rate:  [76-96] 88  Resp:  [16-18] 16  BP: (134-151)/(74-85) 150/85  Body mass index is 25.69 kg/m².  Physical Exam  Vitals and nursing note reviewed.   Constitutional:       General: He is not in acute distress.     Appearance: Normal appearance. He is not ill-appearing.   Cardiovascular:      Rate and Rhythm: Normal rate and regular rhythm.   Pulmonary:      Effort: No respiratory distress.      Breath sounds: Normal breath sounds.   Abdominal:      General: Bowel sounds are normal.      Palpations: Abdomen is soft.   Neurological:      Mental Status: He is alert and oriented to person, place, and time. Mental status is at baseline.       Consultants     Consult Orders (all) (From admission, onward)        Start     Ordered    07/19/23 0621  Inpatient Urology Consult  Once        Specialty:  Urology  Provider:  Marques Horvath MD    07/19/23 0621    07/18/23 1046  Inpatient Nephrology Consult  Once        Specialty:  Nephrology  Provider:  Surjit Hurt MD    07/18/23 1045    07/17/23 1630  Notify Stroke Coordinator  Once        Provider:  (Not yet assigned)    07/17/23 1633    07/17/23 1630  Inpatient Rehab Admission Consult  Once        Provider:  (Not yet assigned)    07/17/23 1633    07/17/23 1630  Inpatient Case Management  Consult  Once        Provider:  (Not yet assigned)    07/17/23 1633    07/17/23 1630  Inpatient Diabetes Educator Consult  Once,   Status:  Canceled        Provider:  (Not yet assigned)    07/17/23 1633    07/17/23 1630  Inpatient Neurology Consult Stroke  Once        Specialty:  Neurology  Provider:  Teodoro He MD    07/17/23 1633    07/17/23 1229  [facility] (on-call MD unless specified) Details  Once        Specialty:  Hospitalist  Provider:  Edwin Cruz MD    07/17/23 1228                  Procedures     * Surgery not found *    Imaging Results (All)       Procedure Component Value Units Date/Time    US Renal Bilateral [[id number]] Collected: 07/18/23 1852     Updated: 07/18/23 1900    Narrative:      US RENAL BILATERAL-clinical: Acute renal insufficiency     FINDINGS: The right kidney is 9.2 cm in length and the left kidney is 11  cm in length. Neither the right or left ureteral jet could be documented  with limited bladder survey. Suggestion of mild debris within the  dependent portion of the bladder. Bladder was moderately distended.     Due to the patient's body habitus, shadowing obscures a large part of  the left kidney. There is some prominence of the renal pelvis however no  definite associated caliectasis. Perhaps prominent extrarenal pelvis,  anatomic variant. Mild hydronephrosis not excluded.     No right-sided obstructive uropathy.     No  definite right or left renal mass seen.     The renal cortical echotexture appears greater than anticipated, likely  related to medical renal disease. The remainder is unremarkable.     This report was finalized on 7/18/2023 6:56 PM by Dr. Emile Hanna M.D.       MRI Brain Without Contrast [[id number]] Collected: 07/18/23 0739     Updated: 07/18/23 1655    Narrative:      MRI EXAMINATION OF THE BRAIN WITHOUT CONTRAST AND MRA OF THE NECK AND  HEAD WITHOUT CONTRAST     HISTORY: Fall, right extremity weakness.     COMPARISON: CT head 07/17/2023.     MRI EXAMINATION OF THE BRAIN WITHOUT CONTRAST:     TECHNIQUE: A MRI examination of the brain was performed utilizing  sagittal T1, axial diffusion, T1, T2, T2 FLAIR and gradient echo T2  imaging.     FINDINGS: There is age-appropriate atrophy. There is no evidence of  restricted diffusion to suggest acute infarction. There is expected  flow-void in the basilar artery and in the distal aspect of the internal  carotid arteries bilaterally on the axial T2 sequence. Increased signal  intensity is noted involving the periventricular white matter cerebral  hemispheres bilaterally and to a lesser extent involving the ludwig  centrally and the deep white matter of cerebral hemispheres bilaterally.  The appearance is consistent with mild-to-moderate small vessel ischemic  disease. There is no evidence of mass or mass effect on this  noncontrasted MRI examination of the brain.       Impression:      Atrophy and mild-to-moderate small vessel ischemic disease  is noted. There is no evidence of acute infarction.        MRA OF THE HEAD WITHOUT CONTRAST:      The left vertebral artery is dominant. There is absent signal within the  hypoplastic right V4 segment. The basilar artery and the proximal  aspects of the posterior cerebral arteries appear unremarkable.     There is signal present within the distal aspect of the internal carotid  arteries bilaterally. The proximal aspects of the  anterior and middle  arteries appear unremarkable. The posterior communicating arteries are  markedly hypoplastic or atretic.     IMPRESSION: There is an isolated posterior circulation supplied by the  left vertebral artery. There is absent signal within the hypoplastic  right V4 segment. This may be secondary to slow flow or occlusion.  Further evaluation could be performed with a CT angiogram of the neck  and head. See above.        MRA OF THE NECK WITHOUT CONTRAST:      The study is hampered by patient motion. The great vessels are arranged  in a classic configuration. The origin of the dominant left vertebral  artery is not well assessed. There is a suggestion of signal loss  involving the left vertebral artery shortly beyond its origin. This may  be secondary to tortuosity, but slow flow or stenosis cannot be  excluded. There is 0% stenosis of the right internal carotid artery  using NASCET criteria. There is signal loss involving the proximal  aspect of the left vertebral artery which may be artifactual secondary  to in plane flow artifact.     IMPRESSION: The study is limited by patient motion and lack of contrast.  The left vertebral artery is dominant. There is signal loss involving  the proximal aspects of the left vertebral artery shortly beyond its  origin. There is also signal loss involving the proximal aspect of the  right internal carotid artery. This may be artifactual. Stenosis cannot  be excluded. Further evaluation with a CT angiogram of the neck and head  is recommended.     This report was finalized on 7/18/2023 4:52 PM by Dr. Carlos Fuentes M.D.       MRI Angiogram Head Without Contrast [[id number]] Collected: 07/18/23 0739     Updated: 07/18/23 1655    Narrative:      MRI EXAMINATION OF THE BRAIN WITHOUT CONTRAST AND MRA OF THE NECK AND  HEAD WITHOUT CONTRAST     HISTORY: Fall, right extremity weakness.     COMPARISON: CT head 07/17/2023.     MRI EXAMINATION OF THE BRAIN WITHOUT CONTRAST:      TECHNIQUE: A MRI examination of the brain was performed utilizing  sagittal T1, axial diffusion, T1, T2, T2 FLAIR and gradient echo T2  imaging.     FINDINGS: There is age-appropriate atrophy. There is no evidence of  restricted diffusion to suggest acute infarction. There is expected  flow-void in the basilar artery and in the distal aspect of the internal  carotid arteries bilaterally on the axial T2 sequence. Increased signal  intensity is noted involving the periventricular white matter cerebral  hemispheres bilaterally and to a lesser extent involving the ludwig  centrally and the deep white matter of cerebral hemispheres bilaterally.  The appearance is consistent with mild-to-moderate small vessel ischemic  disease. There is no evidence of mass or mass effect on this  noncontrasted MRI examination of the brain.       Impression:      Atrophy and mild-to-moderate small vessel ischemic disease  is noted. There is no evidence of acute infarction.        MRA OF THE HEAD WITHOUT CONTRAST:      The left vertebral artery is dominant. There is absent signal within the  hypoplastic right V4 segment. The basilar artery and the proximal  aspects of the posterior cerebral arteries appear unremarkable.     There is signal present within the distal aspect of the internal carotid  arteries bilaterally. The proximal aspects of the anterior and middle  arteries appear unremarkable. The posterior communicating arteries are  markedly hypoplastic or atretic.     IMPRESSION: There is an isolated posterior circulation supplied by the  left vertebral artery. There is absent signal within the hypoplastic  right V4 segment. This may be secondary to slow flow or occlusion.  Further evaluation could be performed with a CT angiogram of the neck  and head. See above.        MRA OF THE NECK WITHOUT CONTRAST:      The study is hampered by patient motion. The great vessels are arranged  in a classic configuration. The origin of the dominant  left vertebral  artery is not well assessed. There is a suggestion of signal loss  involving the left vertebral artery shortly beyond its origin. This may  be secondary to tortuosity, but slow flow or stenosis cannot be  excluded. There is 0% stenosis of the right internal carotid artery  using NASCET criteria. There is signal loss involving the proximal  aspect of the left vertebral artery which may be artifactual secondary  to in plane flow artifact.     IMPRESSION: The study is limited by patient motion and lack of contrast.  The left vertebral artery is dominant. There is signal loss involving  the proximal aspects of the left vertebral artery shortly beyond its  origin. There is also signal loss involving the proximal aspect of the  right internal carotid artery. This may be artifactual. Stenosis cannot  be excluded. Further evaluation with a CT angiogram of the neck and head  is recommended.     This report was finalized on 7/18/2023 4:52 PM by Dr. Carlos Fuentes M.D.       MRI Angiogram Neck Without Contrast [[id number]] Collected: 07/18/23 0739     Updated: 07/18/23 1655    Narrative:      MRI EXAMINATION OF THE BRAIN WITHOUT CONTRAST AND MRA OF THE NECK AND  HEAD WITHOUT CONTRAST     HISTORY: Fall, right extremity weakness.     COMPARISON: CT head 07/17/2023.     MRI EXAMINATION OF THE BRAIN WITHOUT CONTRAST:     TECHNIQUE: A MRI examination of the brain was performed utilizing  sagittal T1, axial diffusion, T1, T2, T2 FLAIR and gradient echo T2  imaging.     FINDINGS: There is age-appropriate atrophy. There is no evidence of  restricted diffusion to suggest acute infarction. There is expected  flow-void in the basilar artery and in the distal aspect of the internal  carotid arteries bilaterally on the axial T2 sequence. Increased signal  intensity is noted involving the periventricular white matter cerebral  hemispheres bilaterally and to a lesser extent involving the ludwig  centrally and the deep white  matter of cerebral hemispheres bilaterally.  The appearance is consistent with mild-to-moderate small vessel ischemic  disease. There is no evidence of mass or mass effect on this  noncontrasted MRI examination of the brain.       Impression:      Atrophy and mild-to-moderate small vessel ischemic disease  is noted. There is no evidence of acute infarction.        MRA OF THE HEAD WITHOUT CONTRAST:      The left vertebral artery is dominant. There is absent signal within the  hypoplastic right V4 segment. The basilar artery and the proximal  aspects of the posterior cerebral arteries appear unremarkable.     There is signal present within the distal aspect of the internal carotid  arteries bilaterally. The proximal aspects of the anterior and middle  arteries appear unremarkable. The posterior communicating arteries are  markedly hypoplastic or atretic.     IMPRESSION: There is an isolated posterior circulation supplied by the  left vertebral artery. There is absent signal within the hypoplastic  right V4 segment. This may be secondary to slow flow or occlusion.  Further evaluation could be performed with a CT angiogram of the neck  and head. See above.        MRA OF THE NECK WITHOUT CONTRAST:      The study is hampered by patient motion. The great vessels are arranged  in a classic configuration. The origin of the dominant left vertebral  artery is not well assessed. There is a suggestion of signal loss  involving the left vertebral artery shortly beyond its origin. This may  be secondary to tortuosity, but slow flow or stenosis cannot be  excluded. There is 0% stenosis of the right internal carotid artery  using NASCET criteria. There is signal loss involving the proximal  aspect of the left vertebral artery which may be artifactual secondary  to in plane flow artifact.     IMPRESSION: The study is limited by patient motion and lack of contrast.  The left vertebral artery is dominant. There is signal loss  involving  the proximal aspects of the left vertebral artery shortly beyond its  origin. There is also signal loss involving the proximal aspect of the  right internal carotid artery. This may be artifactual. Stenosis cannot  be excluded. Further evaluation with a CT angiogram of the neck and head  is recommended.     This report was finalized on 7/18/2023 4:52 PM by Dr. Carlos Fuentes M.D.       CT Head Without Contrast [[id number]] Collected: 07/17/23 1203     Updated: 07/17/23 1628    Narrative:      CT HEAD WITHOUT CONTRAST     HISTORY: Weakness, fall.     COMPARISON: None.     FINDINGS: The brain and ventricles are symmetrical. There is  age-appropriate atrophy. Moderate-to-severe vascular calcification is  noted. Areas of decreased attenuation involving the white matter of the  cerebral hemispheres are noted bilaterally consistent with  mild-to-moderate small vessel ischemic disease. There is no evidence of  a hemorrhage, hydrocephalus or of a focal area of decreased attenuation  to suggest acute infarction. Further evaluation could be performed with  an MRI examination of the brain as indicated.           Radiation dose reduction techniques were utilized, including automated  exposure control and exposure modulation based on body size.     This report was finalized on 7/17/2023 4:25 PM by Dr. Carols Fuentes M.D.               Results for orders placed during the hospital encounter of 07/17/23    Adult Transthoracic Echo Complete W/ Cont if Necessary Per Protocol (With Agitated Saline)    Interpretation Summary    Left ventricular ejection fraction appears to be 56 - 60%.    Left ventricular diastolic function was normal.    Saline test results are negative.    Mild aortic valve stenosis is present.    Pertinent Labs     Results from last 7 days   Lab Units 07/20/23  0652 07/19/23  0725 07/18/23  0824 07/17/23  1028   WBC 10*3/mm3 7.64 7.94 9.05 8.01   HEMOGLOBIN g/dL 10.5* 9.9* 11.2* 11.2*   PLATELETS  10*3/mm3 141 138* 168 176     Results from last 7 days   Lab Units 07/21/23  0631 07/20/23  0652 07/19/23  0725 07/18/23  0824   SODIUM mmol/L 138 143 142 141   POTASSIUM mmol/L 4.2 4.2 4.0 4.0   CHLORIDE mmol/L 108* 113* 111* 107   CO2 mmol/L 20.0* 21.4* 21.0* 24.0   BUN mg/dL 25* 23 30* 34*   CREATININE mg/dL 1.30* 1.46* 1.58* 1.89*   GLUCOSE mg/dL 93 95 92 89   EGFR mL/min/1.73 51.2* 44.6* 40.5* 32.7*     Results from last 7 days   Lab Units 07/21/23  0631 07/20/23  0652 07/19/23 0725 07/17/23  1028   ALBUMIN g/dL 3.4* 3.3* 3.3* 4.3   BILIRUBIN mg/dL  --   --   --  0.4   ALK PHOS U/L  --   --   --  86   AST (SGOT) U/L  --   --   --  19   ALT (SGPT) U/L  --   --   --  14     Results from last 7 days   Lab Units 07/21/23  0631 07/20/23  0652 07/19/23  0725 07/18/23  0824 07/17/23  1028   CALCIUM mg/dL 8.4 8.9 8.7 10.0* 10.8*   ALBUMIN g/dL 3.4* 3.3* 3.3*  --  4.3   MAGNESIUM mg/dL 1.8 1.8 1.8  --   --    PHOSPHORUS mg/dL 2.1* 2.7 2.6  --   --          Results from last 7 days   Lab Units 07/18/23  1234 07/18/23  0824   SODIUM UR mmol/L 61  --    CREATININE UR mg/dL 45.6  --    OSMOLALITY UR mOsm/kg 323  --    EOSINOPHIL SMEAR % EOS/100 Cells 0  --    URIC ACID mg/dL  --  7.0     Results from last 7 days   Lab Units 07/18/23  0824   CHOLESTEROL mg/dL 170   TRIGLYCERIDES mg/dL 69   HDL CHOL mg/dL 44   LDL CHOL mg/dL 113*             Test Results Pending at Discharge     Pending Labs       Order Current Status    CBC & Differential In process    CBC Auto Differential In process    Manual Differential In process            Discharge Details        Discharge Medications        New Medications        Instructions Start Date   amLODIPine 5 MG tablet  Commonly known as: NORVASC   5 mg, Oral, Every 24 Hours Scheduled      aspirin 325 MG tablet   325 mg, Oral, Daily      atorvastatin 80 MG tablet  Commonly known as: LIPITOR   80 mg, Oral, Nightly      lactulose 10 GM/15ML solution  Commonly known as: CHRONULAC   10 g, Oral,  Daily PRN      tamsulosin 0.4 MG capsule 24 hr capsule  Commonly known as: FLOMAX   0.4 mg, Oral, 2 Times Daily             Continue These Medications        Instructions Start Date   mirtazapine 7.5 MG tablet  Commonly known as: REMERON   7.5 mg, Oral, Nightly      Olopatadine HCl 0.7 % solution   1 drop, Both Eyes, 2 Times Daily      ondansetron 4 MG tablet  Commonly known as: ZOFRAN   4 mg, Oral, Every 6 Hours PRN      polyethylene glycol 17 g packet  Commonly known as: MIRALAX   17 g, Oral, Daily      timolol 0.5 % ophthalmic solution  Commonly known as: TIMOPTIC   1 drop, Both Eyes, Every Evening      vitamin B-12 1000 MCG tablet  Commonly known as: CYANOCOBALAMIN   1,000 mcg, Oral, Daily             Stop These Medications      Aspirin Buf(CaCarb-MgCarb-MgO) 81 MG tablet     bumetanide 1 MG tablet  Commonly known as: BUMEX     Calcium 600 + Minerals 600-200 MG-UNIT tablet     docusate sodium 100 MG capsule  Commonly known as: COLACE     losartan 100 MG tablet  Commonly known as: COZAAR     magnesium citrate 1.745 GM/30ML solution solution     multivitamin tablet tablet     valACYclovir 500 MG tablet  Commonly known as: VALTREX              Allergies   Allergen Reactions    Diclofenac Hives and Rash     Gel       Discharge Disposition:  Home or Self Care      Discharge Diet:  Diet Order   Procedures    Diet: Regular/House Diet; Texture: Regular Texture (IDDSI 7); Fluid Consistency: Thin (IDDSI 0)       Discharge Activity:   Activity Instructions       Activity as Tolerated              CODE STATUS:    Code Status and Medical Interventions:   Ordered at: 07/17/23 1635     Level Of Support Discussed With:    Patient     Code Status (Patient has no pulse and is not breathing):    CPR (Attempt to Resuscitate)     Medical Interventions (Patient has pulse or is breathing):    Full Support       Future Appointments   Date Time Provider Department Center   11/9/2023  1:45 PM Thomas Ramírez MD [facility]      Additional Instructions for the Follow-ups that You Need to Schedule       Discharge Follow-up with PCP   As directed       Currently Documented PCP:    Bakari Selby MD    PCP Phone Number:    [phone]     Follow Up Details: 1-2 days         Discharge Follow-up with Specified Provider: nephrology; 1 Month   As directed      To: nephrology    Follow Up: 1 Month         Discharge Follow-up with Specified Provider: neurology; 3 Months   As directed      To: neurology    Follow Up: 3 Months         Discharge Follow-up with Specified Provider: urology; 2 Weeks   As directed      To: urology    Follow Up: 2 Weeks                Contact information for follow-up providers       Bakari Selby MD .    Specialties: Hospitalist, Internal Medicine  Why: 1-2 days  Contact information:  [address]  [facility] [address]  [phone]                       Contact information for after-discharge care       Destination       [facility] .    Service: Assisted Living  Contact information:  [address] [id number] [phone]                     Home Medical Care       [facility]    Services: Home Nursing, Home Rehabilitation  Contact information:  [address], [address]  [facility] [address]  [phone]                                   Additional Instructions for the Follow-ups that You Need to Schedule       Discharge Follow-up with PCP   As directed       Currently Documented PCP:    Bakari Selby MD    PCP Phone Number:    [phone]     Follow Up Details: 1-2 days         Discharge Follow-up with Specified Provider: nephrology; 1 Month   As directed      To: nephrology    Follow Up: 1 Month         Discharge Follow-up with Specified Provider: neurology; 3 Months   As directed      To: neurology    Follow Up: 3 Months         Discharge Follow-up with Specified Provider: urology; 2 Weeks   As  directed      To: urology    Follow Up: 2 Weeks             Time Spent on Discharge:  Greater than 30 minutes      Carlos Bowers MD  [facility]  07/21/23  08:31 EDT

## 2023-07-21 NOTE — PROGRESS NOTES
"  [facility] Progress Note    Chief Complaint: No new complaints    His hematuria has resolved.  His urine is clear.  Urine output has been excellent.  Renal function is improving.  He is tolerating twice daily Flomax.    Review of Systems:    The following systems were reviewed and negative;  respiratory, cardiovascular, and gastrointestinal          Vital Signs  /80 (BP Location: Right arm, Patient Position: Lying)   Pulse 78   Temp 98.2 °F (36.8 °C) (Oral)   Resp 16   Ht 170.2 cm (67\")   Wt 74.4 kg (164 lb)   SpO2 97%   BMI 25.69 kg/m²     Physical Exam:     General Appearance:    Alert, cooperative, NAD   HEENT:    No trauma, pupils reactive, hearing intact   Back:     No CVA tenderness   Lungs:     Respirations unlabored, no wheezing    Heart:    RRR, intact peripheral pulses   Abdomen:   Soft and benign   :  Penis normal testes normal   Extremities:   No edema, no deformity   Lymphatic:   No neck or groin LAD   Skin:   No bleeding, bruising or rashes   Neuro/Psych:   Orientation intact, mood/affect pleasant, no focal findings        Results Review:     I reviewed the patient's new clinical results.  Lab Results (last 24 hours)       Procedure Component Value Units Date/Time    Renal Function Panel [[id number]]  (Abnormal) Collected: 07/20/23 0652    Specimen: Blood Updated: 07/20/23 0749     Glucose 95 mg/dL      BUN 23 mg/dL      Creatinine 1.46 mg/dL      Sodium 143 mmol/L      Potassium 4.2 mmol/L      Chloride 113 mmol/L      CO2 21.4 mmol/L      Calcium 8.9 mg/dL      Albumin 3.3 g/dL      Phosphorus 2.7 mg/dL      Anion Gap 8.6 mmol/L      BUN/Creatinine Ratio 15.8     eGFR 44.6 mL/min/1.73     Narrative:      GFR Normal >60  Chronic Kidney Disease <60  Kidney Failure <15    The GFR formula is only valid for adults with stable renal function between ages 18 and 70.    Magnesium [[id number]]  (Normal) Collected: 07/20/23 0652    Specimen: Blood Updated: 07/20/23 0749     Magnesium 1.8 " mg/dL     CBC & Differential [[id number]]  (Abnormal) Collected: 07/20/23 0652    Specimen: Blood Updated: 07/20/23 0732    Narrative:      The following orders were created for panel order CBC & Differential.  Procedure                               Abnormality         Status                     ---------                               -----------         ------                     CBC Auto Differential[[id number]]        Abnormal            Final result                 Please view results for these tests on the individual orders.    CBC Auto Differential [[id number]]  (Abnormal) Collected: 07/20/23 0652    Specimen: Blood Updated: 07/20/23 0732     WBC 7.64 10*3/mm3      RBC 2.93 10*6/mm3      Hemoglobin 10.5 g/dL      Hematocrit 30.5 %      .1 fL      MCH 35.8 pg      MCHC 34.4 g/dL      RDW 13.7 %      RDW-SD 52.1 fl      MPV 12.0 fL      Platelets 141 10*3/mm3      Neutrophil % 70.4 %      Lymphocyte % 16.4 %      Monocyte % 8.6 %      Eosinophil % 3.4 %      Basophil % 0.9 %      Immature Grans % 0.3 %      Neutrophils, Absolute 5.38 10*3/mm3      Lymphocytes, Absolute 1.25 10*3/mm3      Monocytes, Absolute 0.66 10*3/mm3      Eosinophils, Absolute 0.26 10*3/mm3      Basophils, Absolute 0.07 10*3/mm3      Immature Grans, Absolute 0.02 10*3/mm3      nRBC 0.0 /100 WBC           Imaging Results (Last 24 Hours)       ** No results found for the last 24 hours. **            Medication Review:   I have personally reviewed    Current Facility-Administered Medications:     acetaminophen (TYLENOL) tablet 650 mg, 650 mg, Oral, Q4H PRN, 650 mg at 07/20/23 0908 **OR** acetaminophen (TYLENOL) 160 MG/5ML solution 650 mg, 650 mg, Oral, Q4H PRN **OR** acetaminophen (TYLENOL) suppository 650 mg, 650 mg, Rectal, Q4H PRN, Edwin Cruz MD    amLODIPine (NORVASC) tablet 5 mg, 5 mg, Oral, Q24H, Chel Orantes MD, 5 mg at 07/20/23 0907    aspirin tablet 325 mg, 325 mg, Oral, Daily, 325 mg at 07/20/23 0908 **OR** aspirin  suppository 300 mg, 300 mg, Rectal, Daily, Edwin Cruz MD    atorvastatin (LIPITOR) tablet 80 mg, 80 mg, Oral, Nightly, Edwin Cruz MD, 80 mg at 07/20/23 2046    sennosides-docusate (PERICOLACE) 8.6-50 MG per tablet 2 tablet, 2 tablet, Oral, BID, 2 tablet at 07/20/23 0911 **AND** polyethylene glycol (MIRALAX) packet 17 g, 17 g, Oral, Daily PRN, 17 g at 07/20/23 0908 **AND** bisacodyl (DULCOLAX) EC tablet 5 mg, 5 mg, Oral, Daily PRN, 5 mg at 07/19/23 2047 **AND** bisacodyl (DULCOLAX) suppository 10 mg, 10 mg, Rectal, Daily PRN, Edwin Cruz MD    lactulose (CHRONULAC) 10 GM/15ML solution 10 g, 10 g, Oral, Daily, Chel Orantes MD, 10 g at 07/20/23 0908    mirtazapine (REMERON) tablet 7.5 mg, 7.5 mg, Oral, Nightly, Edwin Cruz MD, 7.5 mg at 07/20/23 2045    ondansetron (ZOFRAN) tablet 4 mg, 4 mg, Oral, Q6H PRN **OR** ondansetron (ZOFRAN) injection 4 mg, 4 mg, Intravenous, Q6H PRN, Edwin Cruz MD    [COMPLETED] Insert Peripheral IV, , , Once **AND** sodium chloride 0.9 % flush 10 mL, 10 mL, Intravenous, PRN, Mario Hawk MD    sodium chloride 0.9 % flush 10 mL, 10 mL, Intravenous, Q12H, Edwin Cruz MD, 10 mL at 07/20/23 2046    sodium chloride 0.9 % flush 10 mL, 10 mL, Intravenous, PRN, Edwin Cruz MD    sodium chloride 0.9 % flush 10 mL, 10 mL, Intravenous, Q12H, Edwin Cruz MD, 10 mL at 07/20/23 2046    sodium chloride 0.9 % flush 10 mL, 10 mL, Intravenous, PRN, Edwin Cruz MD    sodium chloride 0.9 % infusion 40 mL, 40 mL, Intravenous, PRN, Edwin Cruz MD    sodium chloride 0.9 % infusion 40 mL, 40 mL, Intravenous, PRN, Edwin Cruz MD    tamsulosin (FLOMAX) 24 hr capsule 0.4 mg, 0.4 mg, Oral, BID, Nicolas Seay MD, 0.4 mg at 07/20/23 2045    timolol (TIMOPTIC) 0.5 % ophthalmic solution 1 drop, 1 drop, Both Eyes, Q PM, Edwin Cruz MD, 1 drop at 07/20/23 1827    Allergies:    Diclofenac    Assessment:    Active Problems:    Right sided weakness    Primary  hypertension    Acute renal failure    Stage 3 chronic kidney disease    Anemia    HLD (hyperlipidemia)       Urinary retention with resolved gross hematuria    Plan:    Voiding trial tomorrow.      Nicolas Seay MD    7/20/2023  21:29 EDT

## 2023-07-21 NOTE — PLAN OF CARE
VSS. Urine yellow in appearance. Moser removed at 0600 per order. Monitoring for  voiding.       Problem: Adult Inpatient Plan of Care  Goal: Plan of Care Review  Outcome: Ongoing, Progressing  Goal: Patient-Specific Goal (Individualized)  Outcome: Ongoing, Progressing  Goal: Absence of Hospital-Acquired Illness or Injury  Outcome: Ongoing, Progressing  Intervention: Identify and Manage Fall Risk  Recent Flowsheet Documentation  Taken 7/21/2023 0400 by Marcie Gr RN  Safety Promotion/Fall Prevention: safety round/check completed  Taken 7/21/2023 0200 by Marcie Gr RN  Safety Promotion/Fall Prevention: safety round/check completed  Taken 7/21/2023 0027 by Marcie Gr RN  Safety Promotion/Fall Prevention: safety round/check completed  Taken 7/20/2023 2230 by Marcie Gr RN  Safety Promotion/Fall Prevention: safety round/check completed  Taken 7/20/2023 2045 by Marcie Gr RN  Safety Promotion/Fall Prevention:   activity supervised   assistive device/personal items within reach   clutter free environment maintained   fall prevention program maintained   nonskid shoes/slippers when out of bed   safety round/check completed  Intervention: Prevent Skin Injury  Recent Flowsheet Documentation  Taken 7/21/2023 0400 by Marcie Gr RN  Body Position: position changed independently  Taken 7/21/2023 0200 by Marcie Gr RN  Body Position: position changed independently  Taken 7/21/2023 0027 by Marcie Gr RN  Body Position: position changed independently  Taken 7/20/2023 2230 by Marcie Gr RN  Body Position: position changed independently  Taken 7/20/2023 2045 by Marcie Gr RN  Body Position: position changed independently  Intervention: Prevent and Manage VTE (Venous Thromboembolism) Risk  Recent Flowsheet Documentation  Taken 7/20/2023 2045 by Marcie Gr RN  Activity Management: activity encouraged  VTE Prevention/Management:   bilateral   sequential compression  devices on  Goal: Optimal Comfort and Wellbeing  Outcome: Ongoing, Progressing  Goal: Readiness for Transition of Care  Outcome: Ongoing, Progressing     Problem: Fall Injury Risk  Goal: Absence of Fall and Fall-Related Injury  Outcome: Ongoing, Progressing  Intervention: Promote Injury-Free Environment  Recent Flowsheet Documentation  Taken 7/21/2023 0400 by Marcie Gr RN  Safety Promotion/Fall Prevention: safety round/check completed  Taken 7/21/2023 0200 by Marcie Gr RN  Safety Promotion/Fall Prevention: safety round/check completed  Taken 7/21/2023 0027 by Marcie Gr RN  Safety Promotion/Fall Prevention: safety round/check completed  Taken 7/20/2023 2230 by Marcie Gr RN  Safety Promotion/Fall Prevention: safety round/check completed  Taken 7/20/2023 2045 by Marcie Gr RN  Safety Promotion/Fall Prevention:   activity supervised   assistive device/personal items within reach   clutter free environment maintained   fall prevention program maintained   nonskid shoes/slippers when out of bed   safety round/check completed     Problem: Urinary Retention  Goal: Effective Urinary Elimination  Outcome: Ongoing, Progressing

## 2023-07-21 NOTE — PROGRESS NOTES
Case Management Discharge Note      Final Note: Pt returning to [facility] with [facility] via [employer] wheelchair van scheduled at 2:00 PM. Transfer packet on pt chart. Pina Fox LCSW         Selected Continued Care - Admitted Since 7/17/2023       Destination Coordination complete.      Service Provider Selected Services Address Phone Fax Patient Preferred    [facility] [address] [id number] [phone] --              Durable Medical Equipment    No services have been selected for the patient.                Dialysis/Infusion    No services have been selected for the patient.                Home Medical Care Coordination complete.      Service Provider Selected Services Address Phone Fax Patient Preferred    [facility] ,  [facility] Rehabilitation [address], [address], [place] [address] [phone] --              Therapy    No services have been selected for the patient.                Community Resources    No services have been selected for the patient.                Community & DME    No services have been selected for the patient.                    Transportation Services  [facility]    Final Discharge Disposition Code: 01 - home or self-care

## 2023-07-26 ENCOUNTER — READMISSION MANAGEMENT (OUTPATIENT)
Dept: CALL CENTER | Facility: HOSPITAL | Age: 88
End: 2023-07-26
Payer: MEDICARE

## 2023-07-26 NOTE — OUTREACH NOTE
Medical Week 1 Survey      Flowsheet Row Responses   [facility] patient discharged from? [place]   Does the patient have one of the following disease processes/diagnoses(primary or secondary)? Other   Week 1 attempt successful? No   Unsuccessful attempts Attempt 1            Chel THRASHER - Licensed Nurse

## 2023-08-02 NOTE — TELEPHONE ENCOUNTER
“Please be informed that patient has passed. Patient has been marked  in the system. The date of death is: 23    Caller: MARCIN WITH [facility]    Relationship:     Best call back number: [phone]

## 2023-08-14 ENCOUNTER — CLINICAL SUPPORT (OUTPATIENT)
Dept: CARDIOLOGY | Facility: CLINIC | Age: 88
End: 2023-08-14
Payer: MEDICARE

## 2023-08-14 DIAGNOSIS — R33.9 RETENTION OF URINE, UNSPECIFIED: ICD-10-CM

## 2023-08-14 DIAGNOSIS — N18.30 STAGE 3 CHRONIC KIDNEY DISEASE, UNSPECIFIED WHETHER STAGE 3A OR 3B CKD: ICD-10-CM

## 2023-08-14 DIAGNOSIS — N18.9 ANEMIA IN CHRONIC KIDNEY DISEASE, UNSPECIFIED CKD STAGE: ICD-10-CM

## 2023-08-14 DIAGNOSIS — D63.1 ANEMIA IN CHRONIC KIDNEY DISEASE, UNSPECIFIED CKD STAGE: ICD-10-CM

## 2023-08-14 DIAGNOSIS — I12.9 HYPERTENSIVE CHRONIC KIDNEY DISEASE W STG 1-4/UNSP CHR KDNY: Primary | ICD-10-CM
